# Patient Record
Sex: FEMALE | Race: WHITE | NOT HISPANIC OR LATINO | URBAN - METROPOLITAN AREA
[De-identification: names, ages, dates, MRNs, and addresses within clinical notes are randomized per-mention and may not be internally consistent; named-entity substitution may affect disease eponyms.]

---

## 2024-02-26 ENCOUNTER — INPATIENT (INPATIENT)
Facility: HOSPITAL | Age: 74
LOS: 3 days | Discharge: HOME HEALTH | End: 2024-03-01
Attending: ORTHOPAEDIC SURGERY | Admitting: ORTHOPAEDIC SURGERY
Payer: MEDICARE

## 2024-02-26 ENCOUNTER — RESULT REVIEW (OUTPATIENT)
Age: 74
End: 2024-02-26

## 2024-02-26 VITALS
SYSTOLIC BLOOD PRESSURE: 154 MMHG | DIASTOLIC BLOOD PRESSURE: 76 MMHG | TEMPERATURE: 98 F | RESPIRATION RATE: 17 BRPM | HEART RATE: 99 BPM | OXYGEN SATURATION: 100 %

## 2024-02-26 DIAGNOSIS — S72.009A FRACTURE OF UNSPECIFIED PART OF NECK OF UNSPECIFIED FEMUR, INITIAL ENCOUNTER FOR CLOSED FRACTURE: ICD-10-CM

## 2024-02-26 PROBLEM — Z00.00 ENCOUNTER FOR PREVENTIVE HEALTH EXAMINATION: Status: ACTIVE | Noted: 2024-02-26

## 2024-02-26 LAB
ALBUMIN SERPL ELPH-MCNC: 4.2 G/DL — SIGNIFICANT CHANGE UP (ref 3.3–5)
ALP SERPL-CCNC: 63 U/L — SIGNIFICANT CHANGE UP (ref 40–120)
ALT FLD-CCNC: 13 U/L — SIGNIFICANT CHANGE UP (ref 4–33)
ANION GAP SERPL CALC-SCNC: 18 MMOL/L — HIGH (ref 7–14)
APTT BLD: 28.3 SEC — SIGNIFICANT CHANGE UP (ref 24.5–35.6)
AST SERPL-CCNC: 26 U/L — SIGNIFICANT CHANGE UP (ref 4–32)
BASOPHILS # BLD AUTO: 0.04 K/UL — SIGNIFICANT CHANGE UP (ref 0–0.2)
BASOPHILS NFR BLD AUTO: 0.3 % — SIGNIFICANT CHANGE UP (ref 0–2)
BILIRUB SERPL-MCNC: 0.4 MG/DL — SIGNIFICANT CHANGE UP (ref 0.2–1.2)
BLD GP AB SCN SERPL QL: NEGATIVE — SIGNIFICANT CHANGE UP
BUN SERPL-MCNC: 15 MG/DL — SIGNIFICANT CHANGE UP (ref 7–23)
CALCIUM SERPL-MCNC: 9.7 MG/DL — SIGNIFICANT CHANGE UP (ref 8.4–10.5)
CHLORIDE SERPL-SCNC: 99 MMOL/L — SIGNIFICANT CHANGE UP (ref 98–107)
CO2 SERPL-SCNC: 19 MMOL/L — LOW (ref 22–31)
CREAT SERPL-MCNC: 0.55 MG/DL — SIGNIFICANT CHANGE UP (ref 0.5–1.3)
EGFR: 96 ML/MIN/1.73M2 — SIGNIFICANT CHANGE UP
EOSINOPHIL # BLD AUTO: 0.07 K/UL — SIGNIFICANT CHANGE UP (ref 0–0.5)
EOSINOPHIL NFR BLD AUTO: 0.5 % — SIGNIFICANT CHANGE UP (ref 0–6)
GLUCOSE BLDC GLUCOMTR-MCNC: 134 MG/DL — HIGH (ref 70–99)
GLUCOSE BLDC GLUCOMTR-MCNC: 216 MG/DL — HIGH (ref 70–99)
GLUCOSE SERPL-MCNC: 167 MG/DL — HIGH (ref 70–99)
HCT VFR BLD CALC: 31.3 % — LOW (ref 34.5–45)
HGB BLD-MCNC: 10.8 G/DL — LOW (ref 11.5–15.5)
IANC: 12.84 K/UL — HIGH (ref 1.8–7.4)
IMM GRANULOCYTES NFR BLD AUTO: 0.4 % — SIGNIFICANT CHANGE UP (ref 0–0.9)
INR BLD: 1.04 RATIO — SIGNIFICANT CHANGE UP (ref 0.85–1.18)
LYMPHOCYTES # BLD AUTO: 1.57 K/UL — SIGNIFICANT CHANGE UP (ref 1–3.3)
LYMPHOCYTES # BLD AUTO: 10.3 % — LOW (ref 13–44)
MCHC RBC-ENTMCNC: 30.3 PG — SIGNIFICANT CHANGE UP (ref 27–34)
MCHC RBC-ENTMCNC: 34.5 GM/DL — SIGNIFICANT CHANGE UP (ref 32–36)
MCV RBC AUTO: 87.9 FL — SIGNIFICANT CHANGE UP (ref 80–100)
MONOCYTES # BLD AUTO: 0.73 K/UL — SIGNIFICANT CHANGE UP (ref 0–0.9)
MONOCYTES NFR BLD AUTO: 4.8 % — SIGNIFICANT CHANGE UP (ref 2–14)
NEUTROPHILS # BLD AUTO: 12.84 K/UL — HIGH (ref 1.8–7.4)
NEUTROPHILS NFR BLD AUTO: 83.7 % — HIGH (ref 43–77)
NRBC # BLD: 0 /100 WBCS — SIGNIFICANT CHANGE UP (ref 0–0)
NRBC # FLD: 0 K/UL — SIGNIFICANT CHANGE UP (ref 0–0)
PLATELET # BLD AUTO: 234 K/UL — SIGNIFICANT CHANGE UP (ref 150–400)
POTASSIUM SERPL-MCNC: 4.2 MMOL/L — SIGNIFICANT CHANGE UP (ref 3.5–5.3)
POTASSIUM SERPL-SCNC: 4.2 MMOL/L — SIGNIFICANT CHANGE UP (ref 3.5–5.3)
PROT SERPL-MCNC: 6.9 G/DL — SIGNIFICANT CHANGE UP (ref 6–8.3)
PROTHROM AB SERPL-ACNC: 11.6 SEC — SIGNIFICANT CHANGE UP (ref 9.5–13)
RBC # BLD: 3.56 M/UL — LOW (ref 3.8–5.2)
RBC # FLD: 12 % — SIGNIFICANT CHANGE UP (ref 10.3–14.5)
RH IG SCN BLD-IMP: POSITIVE — SIGNIFICANT CHANGE UP
SODIUM SERPL-SCNC: 136 MMOL/L — SIGNIFICANT CHANGE UP (ref 135–145)
WBC # BLD: 15.31 K/UL — HIGH (ref 3.8–10.5)
WBC # FLD AUTO: 15.31 K/UL — HIGH (ref 3.8–10.5)

## 2024-02-26 PROCEDURE — 73501 X-RAY EXAM HIP UNI 1 VIEW: CPT | Mod: 26,XU

## 2024-02-26 PROCEDURE — 73502 X-RAY EXAM HIP UNI 2-3 VIEWS: CPT | Mod: 26,LT

## 2024-02-26 PROCEDURE — 99232 SBSQ HOSP IP/OBS MODERATE 35: CPT | Mod: 57

## 2024-02-26 PROCEDURE — 72170 X-RAY EXAM OF PELVIS: CPT | Mod: 26,XU

## 2024-02-26 PROCEDURE — 99285 EMERGENCY DEPT VISIT HI MDM: CPT

## 2024-02-26 PROCEDURE — 99223 1ST HOSP IP/OBS HIGH 75: CPT

## 2024-02-26 PROCEDURE — 73552 X-RAY EXAM OF FEMUR 2/>: CPT | Mod: 26,LT

## 2024-02-26 PROCEDURE — 71045 X-RAY EXAM CHEST 1 VIEW: CPT | Mod: 26

## 2024-02-26 RX ORDER — CHLORHEXIDINE GLUCONATE 213 G/1000ML
1 SOLUTION TOPICAL
Refills: 0 | Status: COMPLETED | OUTPATIENT
Start: 2024-02-26 | End: 2024-03-01

## 2024-02-26 RX ORDER — DEXTROSE 50 % IN WATER 50 %
15 SYRINGE (ML) INTRAVENOUS ONCE
Refills: 0 | Status: DISCONTINUED | OUTPATIENT
Start: 2024-02-26 | End: 2024-03-01

## 2024-02-26 RX ORDER — OXYCODONE HYDROCHLORIDE 5 MG/1
5 TABLET ORAL EVERY 4 HOURS
Refills: 0 | Status: DISCONTINUED | OUTPATIENT
Start: 2024-02-26 | End: 2024-03-01

## 2024-02-26 RX ORDER — OXYCODONE HYDROCHLORIDE 5 MG/1
10 TABLET ORAL EVERY 4 HOURS
Refills: 0 | Status: DISCONTINUED | OUTPATIENT
Start: 2024-02-26 | End: 2024-03-01

## 2024-02-26 RX ORDER — INSULIN LISPRO 100/ML
VIAL (ML) SUBCUTANEOUS AT BEDTIME
Refills: 0 | Status: DISCONTINUED | OUTPATIENT
Start: 2024-02-26 | End: 2024-03-01

## 2024-02-26 RX ORDER — POVIDONE-IODINE 5 %
1 AEROSOL (ML) TOPICAL ONCE
Refills: 0 | Status: COMPLETED | OUTPATIENT
Start: 2024-02-26 | End: 2024-02-28

## 2024-02-26 RX ORDER — DEXTROSE 50 % IN WATER 50 %
25 SYRINGE (ML) INTRAVENOUS ONCE
Refills: 0 | Status: DISCONTINUED | OUTPATIENT
Start: 2024-02-26 | End: 2024-03-01

## 2024-02-26 RX ORDER — SENNA PLUS 8.6 MG/1
2 TABLET ORAL AT BEDTIME
Refills: 0 | Status: DISCONTINUED | OUTPATIENT
Start: 2024-02-26 | End: 2024-03-01

## 2024-02-26 RX ORDER — SODIUM CHLORIDE 9 MG/ML
1000 INJECTION, SOLUTION INTRAVENOUS
Refills: 0 | Status: DISCONTINUED | OUTPATIENT
Start: 2024-02-26 | End: 2024-03-01

## 2024-02-26 RX ORDER — GLUCAGON INJECTION, SOLUTION 0.5 MG/.1ML
1 INJECTION, SOLUTION SUBCUTANEOUS ONCE
Refills: 0 | Status: DISCONTINUED | OUTPATIENT
Start: 2024-02-26 | End: 2024-03-01

## 2024-02-26 RX ORDER — ACETAMINOPHEN 500 MG
975 TABLET ORAL ONCE
Refills: 0 | Status: COMPLETED | OUTPATIENT
Start: 2024-02-26 | End: 2024-02-26

## 2024-02-26 RX ORDER — ACETAMINOPHEN 500 MG
975 TABLET ORAL EVERY 8 HOURS
Refills: 0 | Status: DISCONTINUED | OUTPATIENT
Start: 2024-02-26 | End: 2024-03-01

## 2024-02-26 RX ORDER — POLYETHYLENE GLYCOL 3350 17 G/17G
17 POWDER, FOR SOLUTION ORAL DAILY
Refills: 0 | Status: DISCONTINUED | OUTPATIENT
Start: 2024-02-26 | End: 2024-03-01

## 2024-02-26 RX ORDER — SIMVASTATIN 20 MG/1
20 TABLET, FILM COATED ORAL AT BEDTIME
Refills: 0 | Status: DISCONTINUED | OUTPATIENT
Start: 2024-02-26 | End: 2024-03-01

## 2024-02-26 RX ORDER — PANTOPRAZOLE SODIUM 20 MG/1
40 TABLET, DELAYED RELEASE ORAL
Refills: 0 | Status: DISCONTINUED | OUTPATIENT
Start: 2024-02-26 | End: 2024-03-01

## 2024-02-26 RX ORDER — INSULIN LISPRO 100/ML
VIAL (ML) SUBCUTANEOUS
Refills: 0 | Status: DISCONTINUED | OUTPATIENT
Start: 2024-02-26 | End: 2024-03-01

## 2024-02-26 RX ORDER — LISINOPRIL 2.5 MG/1
10 TABLET ORAL DAILY
Refills: 0 | Status: DISCONTINUED | OUTPATIENT
Start: 2024-02-26 | End: 2024-02-26

## 2024-02-26 RX ORDER — DEXTROSE 50 % IN WATER 50 %
12.5 SYRINGE (ML) INTRAVENOUS ONCE
Refills: 0 | Status: DISCONTINUED | OUTPATIENT
Start: 2024-02-26 | End: 2024-03-01

## 2024-02-26 RX ADMIN — Medication 2: at 18:51

## 2024-02-26 RX ADMIN — SIMVASTATIN 20 MILLIGRAM(S): 20 TABLET, FILM COATED ORAL at 22:49

## 2024-02-26 RX ADMIN — OXYCODONE HYDROCHLORIDE 10 MILLIGRAM(S): 5 TABLET ORAL at 16:54

## 2024-02-26 RX ADMIN — Medication 975 MILLIGRAM(S): at 22:48

## 2024-02-26 RX ADMIN — Medication 975 MILLIGRAM(S): at 23:30

## 2024-02-26 RX ADMIN — Medication 975 MILLIGRAM(S): at 11:39

## 2024-02-26 RX ADMIN — OXYCODONE HYDROCHLORIDE 10 MILLIGRAM(S): 5 TABLET ORAL at 16:28

## 2024-02-26 NOTE — ED PROVIDER NOTE - PHYSICAL EXAMINATION
Isabella Lockhart MD  GENERAL: Patient awake alert NAD.  HEENT: NC/AT, Moist mucous membranes, EOMI.  LUNGS: CTAB, no wheezes or crackles.   CARDIAC: RRR, no m/r/g.    ABDOMEN: Soft, NT, ND, No rebound, guarding. No CVA tenderness.   EXT: No edema. No calf tenderness. CV 2+DP/PT bilaterally.   L leg: able to bend knee and full ROM of ankle but unable to straight leg raise 2/2 hip pain. no skin changes or lacs. tender to palpation of medial upper femur. no back/spine tenderness. sensation intact  MSK: No spinal tenderness, no deformities.  NEURO: A&Ox3. Moving all extremities.  SKIN: Warm and dry. No rash.  PSYCH: Normal affect.

## 2024-02-26 NOTE — ED PROVIDER NOTE - CHIEF COMPLAINT
The patient is a 73y Female complaining of groin pain.
individual instruction/verbal instruction/written material

## 2024-02-26 NOTE — H&P ADULT - ASSESSMENT
NPO at midnight  FU TTE  Please document medical clearance  Plan for OR 2/27 for L IMN with Dr. Martínez Mccrary PA-C  Team Pager [phone] A/P 73 year old female with L IT fx    NPO at midnight/IVF  FU TTE  Please document medical clearance  Plan for OR 2/27 for L IMN with Dr. Martínez Mccrary PA-C  Team Pager [phone]

## 2024-02-26 NOTE — CONSULT NOTE ADULT - ASSESSMENT
Patient is a 73-year-old female with a history of DM 2, HTN, HLD who presents to the ED with left hip/groin pain after mechanical fall, found w displaced L IT fracture, plan for OR for ORIF     1 - Ortho  - WB per ortho  - npo  - plan for OR possibly later today, pt repots having some water and a bit of bagel earlier - communicated w ortho  # pre-op - labs and EKG pending - exam w holosystolic R upper sternal 4/6 murmur  no hx of CAD, MI, CHF, arrythmia - prior to fall pt w good functional capacity   currently asymptomatic wo CP, SOB, TAI  no hx of pre-op insulin  EKG pending   RCRI score: 0 points assuming renal fx returns ok   Class I Risk, 3.9 % 30-day risk of death, MI, or cardiac arrest  - exam with 4/6 holosystolic murmur R upper sternal border - pt endorses hx of murmur but cannot elucidate further - would favor having TTE prior to OR to screen for AS - if OR urgent     Patient is a 73-year-old female with a history of DM 2, HTN, HLD who presents to the ED with left hip/groin pain after mechanical fall, found w displaced L IT fracture, plan for OR for ORIF     1 - Ortho - IT fx for ORIF  - WB per ortho  - npo  - plan for OR possibly later today per ortho but pt repots having some water and a bite of bagel earlier - communicated w ortho  # pre-op - labs and EKG pending - exam w holosystolic R upper sternal 4/6 murmur  no hx of CAD, MI, CHF, arrythmia - prior to fall pt w good functional capacity   currently asymptomatic wo CP, SOB, TAI  no hx of pre-op insulin  EKG pending   RCRI score: 0 points assuming renal fx returns ok   Class I Risk, 3.9 % 30-day risk of death, MI, or cardiac arrest  - exam with 4/6 holosystolic murmur R upper sternal border - pt endorses hx of murmur but cannot elucidate further - would favor having TTE prior to OR to screen for AS - if OR urgent however, can proceed pending labs and EKG - dw ortho PA  - x-ray L knee look for effusion, denies hx of gout     2- HTN - cw home Lisinopril pending renal fx and AMY  monitor BP    2- HLD -cw home Zocor    3- DM2 - on metformin at home - hold oral and can start NISS for now - FS in  - will follow trend and check A1C and escalate regimen if needed     3- DVT ppx - defer to ortho pending OR

## 2024-02-26 NOTE — ED ADULT NURSE NOTE - OBJECTIVE STATEMENT
pt received to intake. pt is c/o left hip/groin pain post fall today after she tripped. pt denies headaches, dizziness, n/v/d, abdominal pain, SOB, chest pain, fever like symptoms. pt state she has "bad knees" bilaterally, and is unstable at times. pt denies the use of anticoagulants. Pt denies LOC, or hitting her head.  Pt has limited ROM to the left leg due to pain. Pt endorses taking ibuprofen 2 hrs prior to arrival. pt endorses hx of DM type 2, hypertension, or hyperlipidemia. pt respirations even and unlabored on room air. Pt medicated as prescribed. pt is awaiting x-ray. plan of care ongoing.

## 2024-02-26 NOTE — H&P ADULT - NSHPPHYSICALEXAM_GEN_ALL_CORE
PE  Gen: NAD, alert and oriented  Resp: Unlabored breathing  LLE: Skin intact, no ecchymosis,        SILT DP/SP/ Ayaz/Saph/Post Tib       +EHL/FHL/TA/Gastroc,        Knee/ankle painless ROM,        hip ROM limited 2/2 pain,       DP+,        soft compartments, no calf ttp,        +log roll.

## 2024-02-26 NOTE — H&P ADULT - ATTENDING COMMENTS
Agree with above resident assessment and plan. Patient seen and examined at bedside.     Briefly, 72yo Female with PMH of DM and HTN presents to [facility] c/o L hip pain s/p mechanical fall at home. Patient denies head hit or LOC. Patient denies numbness or tingling in the LLE. Patient denies any other injuries. XR confirm displaced IT hip fx. Admitted to [facility] for surgical care.    PE:  Gen: NAD, alert and oriented  Resp: Unlabored breathing  LLE: Skin intact, no ecchymosis,        SILT DP/SP/ Ayaz/Saph/Post Tib       +EHL/FHL/TA/Gastroc,        Knee/ankle painless ROM,        hip ROM limited 2/2 pain,       DP+,        soft compartments, no calf ttp,        +log roll.    A/P 73 year old female with L IT fx  - Risks, benefits and alternatives to surgical IMN were discussed in great detail. Risks include but not limited to pain, bleeding, infection, implant malfunction, medical complications (DVT, PE, MI) and risks of anesthesia. Electing to proceed, informed consent was obtained.   - NPO / IVF / NWB  - Appreciate medical/cards clearance  - Plan OR 2/27 for IMN L hip

## 2024-02-26 NOTE — CONSULT NOTE ADULT - SUBJECTIVE AND OBJECTIVE BOX
Patient is a 73-year-old female with a history of DM 2, HTN, HLD who presents to the ED with left hip/groin pain after mechanical fall.  Patient states that she was walking around at home and had mechanical trip and fall.  She denies any prodromal symptoms such as chest pain, shortness of breath, palpitations, dizziness, lightheadedness.  She did not hit her head and denies loss of consciousness.  She fell onto her left side and was unable to get up.  Her family put her into a wheelchair and drove her to the hospital.  She has not walked since the incident.  She denies numbness or weakness in her foot or lower leg.  She endorses left groin pain, nonradiating.  Denies nausea, vomiting, abdominal pain, back pain.  on further questioning after noticing murmur on exam, pt states she was told by a cardiologist she has a murmur some 3 years prior, she had TTE done but never followed - she denies CP, TAI, LOC      PAST MEDICAL HISTORY:  DM2 (diabetes mellitus, type 2)     HTN (hypertension).   DM2  HLD    PAST SURGICAL HISTORY:  No significant past surgical history.    SOCIAL HISTORY: NC    Allergies: No Known Allergies      ROS:  No HA/DZ  No Vision changes   No CP, SOB  No N/V/D  No Edema  No Rash  NO weakness, numbness  + L hip pain  + L knee pain     MEDICATIONS  (STANDING):    MEDICATIONS  (PRN):      T(C): 36.6 (02-26-24 @ 10:53)  HR: 99 (02-26-24 @ 10:53)  BP: 154/76 (02-26-24 @ 10:53)  RR: 17 (02-26-24 @ 10:53)  SpO2: 100% (02-26-24 @ 10:53)  CAPILLARY BLOOD GLUCOSE      POCT Blood Glucose.: 233 mg/dL (26 Feb 2024 10:56)    I&O's Summary      PHYSICAL EXAM:  GENERAL: NAD, well-developed, AOx3  HEAD:  Atraumatic, Normocephalic  EYES: EOMI, PERRL, conjunctiva and sclera clear  NECK: Supple, No JVD  CHEST/LUNG: Clear to auscultation bilaterally  HEART: Regular rate and rhythm; No murmurs, rubs, or gallops, No Edema  ABDOMEN: Soft, Nontender, Nondistended; Bowel sounds present  EXTREMITIES:  2+ Peripheral Pulses, No clubbing, cyanosis  PSYCH: No SI/HI  NEUROLOGY: non-focal  SKIN: No rashes or lesions  + R knee swelling, warmth     LABS:                        RADIOLOGY & ADDITIONAL TESTS:    Imaging Personally Reviewed:    Consultant(s) Notes Reviewed:      Care Discussed with Consultants/Other Providers: milagros Vásquez   Patient is a 73-year-old female with a history of DM 2, HTN, HLD who presents to the ED with left hip/groin pain after mechanical fall.  Patient states that she was walking around at home and had mechanical trip and fall.  She denies any prodromal symptoms such as chest pain, shortness of breath, palpitations, dizziness, lightheadedness.  She did not hit her head and denies loss of consciousness.  She fell onto her left side and was unable to get up.  Her family put her into a wheelchair and drove her to the hospital.  She has not walked since the incident.  She denies numbness or weakness in her foot or lower leg.  She endorses left groin pain, nonradiating.  Denies nausea, vomiting, abdominal pain, back pain.  on further questioning after noticing murmur on exam, pt states she was told by a cardiologist she has a murmur some 3 years prior, she had TTE done but never followed - she denies CP, TAI, LOC      PAST MEDICAL HISTORY:  DM2 (diabetes mellitus, type 2)     HTN (hypertension).   DM2  HLD    PAST SURGICAL HISTORY:  No significant past surgical history.    SOCIAL HISTORY: NC    Allergies: No Known Allergies      ROS:  No HA/DZ  No Vision changes   No CP, SOB  No N/V/D  No Edema  No Rash  NO weakness, numbness  + L hip pain  + L knee pain     MEDICATIONS  (STANDING):    MEDICATIONS  (PRN):      T(C): 36.6 (02-26-24 @ 10:53)  HR: 99 (02-26-24 @ 10:53)  BP: 154/76 (02-26-24 @ 10:53)  RR: 17 (02-26-24 @ 10:53)  SpO2: 100% (02-26-24 @ 10:53)  CAPILLARY BLOOD GLUCOSE      POCT Blood Glucose.: 233 mg/dL (26 Feb 2024 10:56)    I&O's Summary      PHYSICAL EXAM:  GENERAL: NAD, well-developed, AOx3  HEAD:  Atraumatic, Normocephalic  EYES: EOMI, PERRL, conjunctiva and sclera clear  NECK: Supple, No JVD  CHEST/LUNG: Clear to auscultation bilaterally  HEART: Regular rate and rhythm; 4/6 r sternal border murmur, no rubs, or gallops, No Edema  ABDOMEN: Soft, Nontender, Nondistended; Bowel sounds present  EXTREMITIES:  2+ Peripheral Pulses, No clubbing, cyanosis  PSYCH: No SI/HI  NEUROLOGY: non-focal  SKIN: No rashes or lesions  + R knee swelling, warmth     LABS:                        RADIOLOGY & ADDITIONAL TESTS:    Imaging Personally Reviewed:    Consultant(s) Notes Reviewed:      Care Discussed with Consultants/Other Providers: milagros Vásquez

## 2024-02-26 NOTE — ED PROVIDER NOTE - CLINICAL SUMMARY MEDICAL DECISION MAKING FREE TEXT BOX
Richy - Patient is a 73-year-old female with a history of DM 2, HTN who presents to the ED with left hip/groin pain after mechanical fall.  ddx includes but is not limited to femur or hip or pelvis fx. will check xrays. give Tylenol for pain

## 2024-02-26 NOTE — ED PROVIDER NOTE - OBJECTIVE STATEMENT
Patient is a 73-year-old female with a history of DM 2, HTN who presents to the ED with left hip/groin pain after mechanical fall.  Patient states that she was walking around at home and had mechanical trip and fall.  She denies any prodromal symptoms such as chest pain, shortness of breath, palpitations, dizziness, lightheadedness.  She did not hit her head and denies loss of consciousness.  She fell onto her left side and was unable to get up.  Her family put her into a wheelchair and drove her to the hospital.  She has not walked since the incident.  She denies numbness or weakness in her foot or lower leg.  She endorses left groin pain, nonradiating.  Denies nausea, vomiting, abdominal pain, back pain.

## 2024-02-26 NOTE — ED PROVIDER NOTE - ATTENDING CONTRIBUTION TO CARE
Agree with fellow note  73-year-old female with history of diabetes and hypertension presents after fall at home.  Patient states clearly mechanical, "clumsy "causing her to trip and fall.  Patient denies any medical complaints prior to fall.  States landed on left side/hip.  Has not been able to get up since incident.  Denies any other injuries.  Denies hitting her head.  Physical exam  Well-appearing female in no respiratory distress  Vital signs stable  Clear to auscultation bilaterally  S1-S2 no murmurs rubs or gallops  Abdomen soft nontender nondistended  Pelvis tenderness to left aspect of pelvis  Hip left leg slightly shorter than right leg, pain to lateral aspect of hip  Impression  X-ray shows left IT fracture  Preop labs sent, Ortho consulted

## 2024-02-26 NOTE — H&P ADULT - HISTORY OF PRESENT ILLNESS
73yFemale with PMH of DM and HTN presents to [facility] c/o L hip pain s/p mechanical fall at home. Patient denies head hit or LOC. Patient denies numbness or tingling in the LLE. Patient denies any other injuries.

## 2024-02-26 NOTE — ED ADULT TRIAGE NOTE - CHIEF COMPLAINT QUOTE
patient c/o L groin pain s/p trip and fall today. denies any head trauma or anticoagulant use. patient having trouble with weight bearing. past medical history of diabetes mellitus type 2, hypertension, hyperlipidemia

## 2024-02-26 NOTE — H&P ADULT - NSHPLABSRESULTS_GEN_ALL_CORE
T(C): 36.6 (02-26-24 @ 10:53)  HR: 99 (02-26-24 @ 10:53)  BP: 154/76 (02-26-24 @ 10:53)  RR: 17 (02-26-24 @ 10:53)  SpO2: 100% (02-26-24 @ 10:53)  Wt(kg): --                        10.8   15.31 )-----------( 234      ( 26 Feb 2024 14:45 )             31.3           PT/INR - ( 26 Feb 2024 14:45 )   PT: 11.6 sec;   INR: 1.04 ratio         PTT - ( 26 Feb 2024 14:45 )  PTT:28.3 sec    Secondary:  No TTP over bony landmarks, SILT BL, ROM intact BL, distal pulses palpable.    Imaging:  XR demonstrating L IT fracture

## 2024-02-27 ENCOUNTER — APPOINTMENT (OUTPATIENT)
Dept: ORTHOPEDIC SURGERY | Facility: HOSPITAL | Age: 74
End: 2024-02-27

## 2024-02-27 ENCOUNTER — TRANSCRIPTION ENCOUNTER (OUTPATIENT)
Age: 74
End: 2024-02-27

## 2024-02-27 DIAGNOSIS — I35.0 NONRHEUMATIC AORTIC (VALVE) STENOSIS: ICD-10-CM

## 2024-02-27 LAB
A1C WITH ESTIMATED AVERAGE GLUCOSE RESULT: 6.2 % — HIGH (ref 4–5.6)
ANION GAP SERPL CALC-SCNC: 13 MMOL/L — SIGNIFICANT CHANGE UP (ref 7–14)
APTT BLD: 27.8 SEC — SIGNIFICANT CHANGE UP (ref 24.5–35.6)
BASOPHILS # BLD AUTO: 0.02 K/UL — SIGNIFICANT CHANGE UP (ref 0–0.2)
BASOPHILS NFR BLD AUTO: 0.2 % — SIGNIFICANT CHANGE UP (ref 0–2)
BLD GP AB SCN SERPL QL: NEGATIVE — SIGNIFICANT CHANGE UP
BUN SERPL-MCNC: 14 MG/DL — SIGNIFICANT CHANGE UP (ref 7–23)
CALCIUM SERPL-MCNC: 9.4 MG/DL — SIGNIFICANT CHANGE UP (ref 8.4–10.5)
CHLORIDE SERPL-SCNC: 96 MMOL/L — LOW (ref 98–107)
CHLORIDE UR-SCNC: 24 MMOL/L — SIGNIFICANT CHANGE UP
CO2 SERPL-SCNC: 22 MMOL/L — SIGNIFICANT CHANGE UP (ref 22–31)
CREAT SERPL-MCNC: 0.61 MG/DL — SIGNIFICANT CHANGE UP (ref 0.5–1.3)
EGFR: 94 ML/MIN/1.73M2 — SIGNIFICANT CHANGE UP
EOSINOPHIL # BLD AUTO: 0 K/UL — SIGNIFICANT CHANGE UP (ref 0–0.5)
EOSINOPHIL NFR BLD AUTO: 0 % — SIGNIFICANT CHANGE UP (ref 0–6)
ESTIMATED AVERAGE GLUCOSE: 131 — SIGNIFICANT CHANGE UP
GLUCOSE BLDC GLUCOMTR-MCNC: 175 MG/DL — HIGH (ref 70–99)
GLUCOSE BLDC GLUCOMTR-MCNC: 179 MG/DL — HIGH (ref 70–99)
GLUCOSE BLDC GLUCOMTR-MCNC: 187 MG/DL — HIGH (ref 70–99)
GLUCOSE BLDC GLUCOMTR-MCNC: 196 MG/DL — HIGH (ref 70–99)
GLUCOSE SERPL-MCNC: 188 MG/DL — HIGH (ref 70–99)
HCT VFR BLD CALC: 27.6 % — LOW (ref 34.5–45)
HGB BLD-MCNC: 9.3 G/DL — LOW (ref 11.5–15.5)
IANC: 6.98 K/UL — SIGNIFICANT CHANGE UP (ref 1.8–7.4)
IMM GRANULOCYTES NFR BLD AUTO: 0.3 % — SIGNIFICANT CHANGE UP (ref 0–0.9)
INR BLD: 1.1 RATIO — SIGNIFICANT CHANGE UP (ref 0.85–1.18)
LYMPHOCYTES # BLD AUTO: 1.7 K/UL — SIGNIFICANT CHANGE UP (ref 1–3.3)
LYMPHOCYTES # BLD AUTO: 18.3 % — SIGNIFICANT CHANGE UP (ref 13–44)
MCHC RBC-ENTMCNC: 30.9 PG — SIGNIFICANT CHANGE UP (ref 27–34)
MCHC RBC-ENTMCNC: 33.7 GM/DL — SIGNIFICANT CHANGE UP (ref 32–36)
MCV RBC AUTO: 91.7 FL — SIGNIFICANT CHANGE UP (ref 80–100)
MONOCYTES # BLD AUTO: 0.57 K/UL — SIGNIFICANT CHANGE UP (ref 0–0.9)
MONOCYTES NFR BLD AUTO: 6.1 % — SIGNIFICANT CHANGE UP (ref 2–14)
NEUTROPHILS # BLD AUTO: 6.98 K/UL — SIGNIFICANT CHANGE UP (ref 1.8–7.4)
NEUTROPHILS NFR BLD AUTO: 75.1 % — SIGNIFICANT CHANGE UP (ref 43–77)
NRBC # BLD: 0 /100 WBCS — SIGNIFICANT CHANGE UP (ref 0–0)
NRBC # FLD: 0 K/UL — SIGNIFICANT CHANGE UP (ref 0–0)
OSMOLALITY SERPL: 287 MOSM/KG — SIGNIFICANT CHANGE UP (ref 275–295)
OSMOLALITY UR: 242 MOSM/KG — SIGNIFICANT CHANGE UP (ref 50–1200)
PLATELET # BLD AUTO: 214 K/UL — SIGNIFICANT CHANGE UP (ref 150–400)
POTASSIUM SERPL-MCNC: 4 MMOL/L — SIGNIFICANT CHANGE UP (ref 3.5–5.3)
POTASSIUM SERPL-SCNC: 4 MMOL/L — SIGNIFICANT CHANGE UP (ref 3.5–5.3)
POTASSIUM UR-SCNC: 29.3 MMOL/L — SIGNIFICANT CHANGE UP
PROTHROM AB SERPL-ACNC: 12.4 SEC — SIGNIFICANT CHANGE UP (ref 9.5–13)
RBC # BLD: 3.01 M/UL — LOW (ref 3.8–5.2)
RBC # FLD: 12.1 % — SIGNIFICANT CHANGE UP (ref 10.3–14.5)
RH IG SCN BLD-IMP: POSITIVE — SIGNIFICANT CHANGE UP
SODIUM SERPL-SCNC: 131 MMOL/L — LOW (ref 135–145)
SODIUM UR-SCNC: 24 MMOL/L — SIGNIFICANT CHANGE UP
WBC # BLD: 9.3 K/UL — SIGNIFICANT CHANGE UP (ref 3.8–10.5)
WBC # FLD AUTO: 9.3 K/UL — SIGNIFICANT CHANGE UP (ref 3.8–10.5)

## 2024-02-27 PROCEDURE — 99233 SBSQ HOSP IP/OBS HIGH 50: CPT

## 2024-02-27 PROCEDURE — 99223 1ST HOSP IP/OBS HIGH 75: CPT | Mod: FS

## 2024-02-27 RX ORDER — INFLUENZA VIRUS VACCINE 15; 15; 15; 15 UG/.5ML; UG/.5ML; UG/.5ML; UG/.5ML
0.7 SUSPENSION INTRAMUSCULAR ONCE
Refills: 0 | Status: DISCONTINUED | OUTPATIENT
Start: 2024-02-27 | End: 2024-03-01

## 2024-02-27 RX ORDER — POVIDONE-IODINE 5 %
1 AEROSOL (ML) TOPICAL ONCE
Refills: 0 | Status: DISCONTINUED | OUTPATIENT
Start: 2024-02-27 | End: 2024-02-27

## 2024-02-27 RX ORDER — ENOXAPARIN SODIUM 100 MG/ML
40 INJECTION SUBCUTANEOUS ONCE
Refills: 0 | Status: COMPLETED | OUTPATIENT
Start: 2024-02-27 | End: 2024-02-27

## 2024-02-27 RX ORDER — CHLORHEXIDINE GLUCONATE 213 G/1000ML
1 SOLUTION TOPICAL ONCE
Refills: 0 | Status: COMPLETED | OUTPATIENT
Start: 2024-02-27 | End: 2024-02-28

## 2024-02-27 RX ADMIN — CHLORHEXIDINE GLUCONATE 1 APPLICATION(S): 213 SOLUTION TOPICAL at 08:00

## 2024-02-27 RX ADMIN — OXYCODONE HYDROCHLORIDE 5 MILLIGRAM(S): 5 TABLET ORAL at 12:13

## 2024-02-27 RX ADMIN — Medication 975 MILLIGRAM(S): at 23:15

## 2024-02-27 RX ADMIN — Medication 975 MILLIGRAM(S): at 07:28

## 2024-02-27 RX ADMIN — OXYCODONE HYDROCHLORIDE 10 MILLIGRAM(S): 5 TABLET ORAL at 20:00

## 2024-02-27 RX ADMIN — Medication 975 MILLIGRAM(S): at 22:10

## 2024-02-27 RX ADMIN — Medication 1: at 17:23

## 2024-02-27 RX ADMIN — SODIUM CHLORIDE 100 MILLILITER(S): 9 INJECTION, SOLUTION INTRAVENOUS at 06:38

## 2024-02-27 RX ADMIN — SODIUM CHLORIDE 100 MILLILITER(S): 9 INJECTION, SOLUTION INTRAVENOUS at 03:47

## 2024-02-27 RX ADMIN — SIMVASTATIN 20 MILLIGRAM(S): 20 TABLET, FILM COATED ORAL at 21:11

## 2024-02-27 RX ADMIN — Medication 975 MILLIGRAM(S): at 13:58

## 2024-02-27 RX ADMIN — PANTOPRAZOLE SODIUM 40 MILLIGRAM(S): 20 TABLET, DELAYED RELEASE ORAL at 06:40

## 2024-02-27 RX ADMIN — Medication 975 MILLIGRAM(S): at 06:39

## 2024-02-27 RX ADMIN — OXYCODONE HYDROCHLORIDE 5 MILLIGRAM(S): 5 TABLET ORAL at 11:43

## 2024-02-27 RX ADMIN — SODIUM CHLORIDE 100 MILLILITER(S): 9 INJECTION, SOLUTION INTRAVENOUS at 17:26

## 2024-02-27 RX ADMIN — OXYCODONE HYDROCHLORIDE 10 MILLIGRAM(S): 5 TABLET ORAL at 19:36

## 2024-02-27 RX ADMIN — OXYCODONE HYDROCHLORIDE 5 MILLIGRAM(S): 5 TABLET ORAL at 04:17

## 2024-02-27 RX ADMIN — OXYCODONE HYDROCHLORIDE 5 MILLIGRAM(S): 5 TABLET ORAL at 03:47

## 2024-02-27 RX ADMIN — Medication 1: at 07:57

## 2024-02-27 RX ADMIN — Medication 1: at 11:44

## 2024-02-27 RX ADMIN — ENOXAPARIN SODIUM 40 MILLIGRAM(S): 100 INJECTION SUBCUTANEOUS at 16:07

## 2024-02-27 NOTE — PATIENT PROFILE ADULT - FALL HARM RISK - HARM RISK INTERVENTIONS
Assistance with ambulation/Assistance OOB with selected safe patient handling equipment/Communicate Risk of Fall with Harm to all staff/Discuss with provider need for PT consult/Monitor gait and stability/Reinforce activity limits and safety measures with patient and family/Tailored Fall Risk Interventions/Visual Cue: Yellow wristband and red socks/Bed in lowest position, wheels locked, appropriate side rails in place/Call bell, personal items and telephone in reach/Instruct patient to call for assistance before getting out of bed or chair/Non-slip footwear when patient is out of bed/Dale to call system/Physically safe environment - no spills, clutter or unnecessary equipment/Purposeful Proactive Rounding/Room/bathroom lighting operational, light cord in reach

## 2024-02-27 NOTE — PROGRESS NOTE ADULT - SUBJECTIVE AND OBJECTIVE BOX
Patient is a 73y old  Female who presents with a chief complaint of L hip fx (27 Feb 2024 10:35)      SUBJECTIVE / OVERNIGHT EVENTS: OR cancelled - TTE noted, DW ortho - dw pt and daughter at bedside - pt asymptomatic     ROS:  No HA/DZ  No Vision changes   No CP, SOB  No N/V/D  No Edema  No Rash  NO weakness, numbness    MEDICATIONS  (STANDING):  acetaminophen     Tablet .. 975 milliGRAM(s) Oral every 8 hours  chlorhexidine 2% Cloths 1 Application(s) Topical <User Schedule>  dextrose 5%. 1000 milliLiter(s) (50 mL/Hr) IV Continuous <Continuous>  dextrose 5%. 1000 milliLiter(s) (100 mL/Hr) IV Continuous <Continuous>  dextrose 50% Injectable 12.5 Gram(s) IV Push once  dextrose 50% Injectable 25 Gram(s) IV Push once  dextrose 50% Injectable 25 Gram(s) IV Push once  glucagon  Injectable 1 milliGRAM(s) IntraMuscular once  insulin lispro (ADMELOG) corrective regimen sliding scale   SubCutaneous three times a day before meals  insulin lispro (ADMELOG) corrective regimen sliding scale   SubCutaneous at bedtime  lactated ringers. 1000 milliLiter(s) (100 mL/Hr) IV Continuous <Continuous>  pantoprazole    Tablet 40 milliGRAM(s) Oral before breakfast  polyethylene glycol 3350 17 Gram(s) Oral daily  povidone iodine 5% Nasal Swab 1 Application(s) Both Nostrils once  senna 2 Tablet(s) Oral at bedtime  simvastatin 20 milliGRAM(s) Oral at bedtime    MEDICATIONS  (PRN):  dextrose Oral Gel 15 Gram(s) Oral once PRN Blood Glucose LESS THAN 70 milliGRAM(s)/deciliter  oxyCODONE    IR 10 milliGRAM(s) Oral every 4 hours PRN Severe Pain (7 - 10)  oxyCODONE    IR 5 milliGRAM(s) Oral every 4 hours PRN Moderate Pain (4 - 6)      T(C): 36.6 (02-27-24 @ 10:57)  HR: 92 (02-27-24 @ 10:57)  BP: 120/64 (02-27-24 @ 10:57)  RR: 16 (02-27-24 @ 10:57)  SpO2: 96% (02-27-24 @ 10:57)  CAPILLARY BLOOD GLUCOSE      POCT Blood Glucose.: 179 mg/dL (27 Feb 2024 07:30)  POCT Blood Glucose.: 134 mg/dL (26 Feb 2024 22:40)  POCT Blood Glucose.: 216 mg/dL (26 Feb 2024 18:09)    I&O's Summary      PHYSICAL EXAM:  GENERAL: NAD, well-developed, AOx3  HEAD:  Atraumatic, Normocephalic  EYES: EOMI, PERRL, conjunctiva and sclera clear  NECK: Supple, No JVD  CHEST/LUNG: Clear to auscultation bilaterally  HEART: Regular rate and rhythm; + murmurs, no rubs, or gallops, No Edema  ABDOMEN: Soft, Nontender, Nondistended; Bowel sounds present  EXTREMITIES:  2+ Peripheral Pulses, No clubbing, cyanosis  PSYCH: No SI/HI  NEUROLOGY: non-focal  SKIN: No rashes or lesions  L knee swelling     LABS:                        9.3    9.30  )-----------( 214      ( 27 Feb 2024 01:09 )             27.6     02-27    131<L>  |  96<L>  |  14  ----------------------------<  188<H>  4.0   |  22  |  0.61    Ca    9.4      27 Feb 2024 01:09    TPro  6.9  /  Alb  4.2  /  TBili  0.4  /  DBili  x   /  AST  26  /  ALT  13  /  AlkPhos  63  02-26    PT/INR - ( 27 Feb 2024 01:09 )   PT: 12.4 sec;   INR: 1.10 ratio         PTT - ( 27 Feb 2024 01:09 )  PTT:27.8 sec      Urinalysis Basic - ( 27 Feb 2024 01:09 )    Color: x / Appearance: x / SG: x / pH: x  Gluc: 188 mg/dL / Ketone: x  / Bili: x / Urobili: x   Blood: x / Protein: x / Nitrite: x   Leuk Esterase: x / RBC: x / WBC x   Sq Epi: x / Non Sq Epi: x / Bacteria: x            RADIOLOGY & ADDITIONAL TESTS:    Imaging Personally Reviewed:    Consultant(s) Notes Reviewed:      Care Discussed with Consultants/Other Providers:

## 2024-02-27 NOTE — PROGRESS NOTE ADULT - SUBJECTIVE AND OBJECTIVE BOX
Patient history reviewed. Preop evaluation shows critical aortic stenosis, discussed risk/benefit of urgent hip IMN with Dr. Hines. Recommendation from Cardiology is for preoperative mechanical cardiology consultation to determine risk of Ortho surgery and consideration of preoperative TAVR given findings on echo.    OR currently on hold for additional cardiac workup and possible transfer to [facility] for urgent critical cardiac care.     Will follow

## 2024-02-27 NOTE — CONSULT NOTE ADULT - NS ATTEND AMEND GEN_ALL_CORE FT
Patient seen and examined with the physician assistant, Currently patient is in bedComplaining of pain on the right hip but otherwise no shortness of breath or chest pain symptoms.  I spoke to the patient and her daughter her fall is mechanical in nature because of her inability to maintain her balance because of her knee pain.  Patient reports that she did not lose any consciousness or any dizziness prior to the event.  In addition patient states that prior to this event over the last month she was able to walk a flight of stairs without any shortness of breath but her activity is mostly limited because of her knee pains.  Patient denies any orthopnea PND or edema in the preceding months.  I reviewed the echocardiogram, Patient does have severe aortic stenosis with normal ejection fraction.  Recommendations from cardiologist vascular standpoint are as follows.    - it is imperative that her perioperative management around the time getting the hip surgery should be managed by Cardiac anesthesiologist with special focus on minimizing BP and volume shifts. She is higher than average patient for having adverse cardiovascular events due to fixed obstructive valve condition,   -Post operative she should be monitored on telemetry if she is stable  - she will need further workup and TAVR/SAVR for aortic stenosis depending on her clinical course .  But this will likeley be done electively once she has recovered from her hip surgery.  Going by patient's history she did not have any symptoms to report related to her underlying aortic valve disease .  But will reassess her symptoms and due  courseOn subsequent follow-ups.  - currently patient has main issues related to her right hip pain from the hip fracture, that will have significant negative bearing if not treated appropriately. I recommended her hip surgery should be performed By orthopedics In conjunction with cardiac anesthesia Managing her perioperative care. There is no indication for BAV/TAVR to be be urgently preop knowing that she is othetwise stable,   -Please call us with any perioperatove issues. We will follow the patient in the postop period.

## 2024-02-27 NOTE — CONSULT NOTE ADULT - ASSESSMENT
Pre-Operative Cardiac Risk Stratification and Optimization    Based on patient history and physical exam, the patient is considered to have high risk for major CV events   Echo shows critical aortic stenosis  although she is asymptomatic , her ET is limited due to knee OA   EKG shows diffuse ischemia which can be due to her critical AS  given critical range aortic valve stenosis , recommend structural heart consult to evaluate for BAV vs TAVR if feasible prior to surgery ( Dr Aries Crockett contacted )     Aortic stenosis  critical range   BP labile   would hold off to BB for now     HLD   on statin      Advanced care planning was discussed with patient and family.  Risks, benefits and alternatives of the cardiac treatments and medical therapy including procedures were discussed in detail and all questions were answered. Importance of compliance with medical therapy and lifestyle modification to improve cardiovascular health were addressed. Appropriate forms and patient educational materials were reviewed. 30 minutes face to face spent.

## 2024-02-27 NOTE — CONSULT NOTE ADULT - SUBJECTIVE AND OBJECTIVE BOX
CHIEF COMPLAINT:    HISTORY OF PRESENT ILLNESS:      Allergies    No Known Allergies    Intolerances    	    MEDICATIONS:        acetaminophen     Tablet .. 975 milliGRAM(s) Oral every 8 hours  oxyCODONE    IR 5 milliGRAM(s) Oral every 4 hours PRN  oxyCODONE    IR 10 milliGRAM(s) Oral every 4 hours PRN    pantoprazole    Tablet 40 milliGRAM(s) Oral before breakfast  polyethylene glycol 3350 17 Gram(s) Oral daily  senna 2 Tablet(s) Oral at bedtime    dextrose 50% Injectable 25 Gram(s) IV Push once  dextrose 50% Injectable 12.5 Gram(s) IV Push once  dextrose 50% Injectable 25 Gram(s) IV Push once  dextrose Oral Gel 15 Gram(s) Oral once PRN  glucagon  Injectable 1 milliGRAM(s) IntraMuscular once  insulin lispro (ADMELOG) corrective regimen sliding scale   SubCutaneous at bedtime  insulin lispro (ADMELOG) corrective regimen sliding scale   SubCutaneous three times a day before meals  simvastatin 20 milliGRAM(s) Oral at bedtime    chlorhexidine 2% Cloths 1 Application(s) Topical <User Schedule>  dextrose 5%. 1000 milliLiter(s) IV Continuous <Continuous>  dextrose 5%. 1000 milliLiter(s) IV Continuous <Continuous>  lactated ringers. 1000 milliLiter(s) IV Continuous <Continuous>  povidone iodine 5% Nasal Swab 1 Application(s) Both Nostrils once      PAST MEDICAL & SURGICAL HISTORY:  DM2 (diabetes mellitus, type 2)      HTN (hypertension)      No significant past surgical history          FAMILY HISTORY:      SOCIAL HISTORY:    [ ] Non-smoker  [ ] Smoker  [ ] Alcohol  [ ] Denies Alcohol      REVIEW OF SYSTEMS:  CONSTITUTIONAL: no fevers or chills  EYES/ENT: No visual changes; No vertigo or throat pain  NECK: No JVD  RESPIRATORY:  No cough, wheezing, chills or hemoptysis, SOB  CARDIOVASCULAR: No chest pain, palpitations, passing out, dizziness, or leg swelling  GASTROINTESTINAL: No abdominal or epigastric pain. No nausea/vomiting/melena  Genitourinary: No dysuria, frequency or hematuria  NEUROLOGICAL: No numbness or weakness  SKIN: No itching, burning, rashes or lesions      PHYSICAL EXAM:  T(C): 36.6 (02-27-24 @ 10:57), Max: 37.1 (02-26-24 @ 17:19)  HR: 92 (02-27-24 @ 10:57) (72 - 92)  BP: 120/64 (02-27-24 @ 10:57) (91/44 - 129/60)  RR: 16 (02-27-24 @ 10:57) (15 - 18)  SpO2: 96% (02-27-24 @ 10:57) (96% - 99%)  Wt(kg): --  ICU Vital Signs Last 24 Hrs  T(C): 36.6 (27 Feb 2024 10:57), Max: 37.1 (26 Feb 2024 17:19)  T(F): 97.8 (27 Feb 2024 10:57), Max: 98.7 (26 Feb 2024 17:19)  HR: 92 (27 Feb 2024 10:57) (72 - 92)  BP: 120/64 (27 Feb 2024 10:57) (91/44 - 129/60)  BP(mean): --  ABP: --  ABP(mean): --  RR: 16 (27 Feb 2024 10:57) (15 - 18)  SpO2: 96% (27 Feb 2024 10:57) (96% - 99%)    O2 Parameters below as of 27 Feb 2024 10:57  Patient On (Oxygen Delivery Method): room air          I&O's Summary        Appearance: Normal	  HEENT:   Normal oral mucosa	  Cardiovascular: Normal S1 S2, No JVD, No murmurs, No edema  Respiratory: Lungs clear to auscultation	  Psychiatry: A & O x 3, Mood & affect appropriate  Gastrointestinal:  Soft, Non-tender, + BS	  Skin: No rashes, No ecchymoses, No cyanosis	  Neurologic: Non-focal  Extremities: Warm and well perfused. 2+ pulses bilaterally        LABS:	 	    CBC Full  -  ( 27 Feb 2024 01:09 )  WBC Count : 9.30 K/uL  Hemoglobin : 9.3 g/dL  Hematocrit : 27.6 %  Platelet Count - Automated : 214 K/uL  Mean Cell Volume : 91.7 fL  Mean Cell Hemoglobin : 30.9 pg  Mean Cell Hemoglobin Concentration : 33.7 gm/dL  Auto Neutrophil # : 6.98 K/uL  Auto Lymphocyte # : 1.70 K/uL  Auto Monocyte # : 0.57 K/uL  Auto Eosinophil # : 0.00 K/uL  Auto Basophil # : 0.02 K/uL  Auto Neutrophil % : 75.1 %  Auto Lymphocyte % : 18.3 %  Auto Monocyte % : 6.1 %  Auto Eosinophil % : 0.0 %  Auto Basophil % : 0.2 %    02-27    131<L>  |  96<L>  |  14  ----------------------------<  188<H>  4.0   |  22  |  0.61  02-26    136  |  99  |  15  ----------------------------<  167<H>  4.2   |  19<L>  |  0.55    Ca    9.4      27 Feb 2024 01:09  Ca    9.7      26 Feb 2024 14:45    TPro  6.9  /  Alb  4.2  /  TBili  0.4  /  DBili  x   /  AST  26  /  ALT  13  /  AlkPhos  63  02-26      proBNP:   Lipid Profile:   HgA1c:   TSH:     CARDIAC MARKERS:                TELEMETRY: 	    ECG:  	    PREVIOUS DIAGNOSTIC TESTING:    [ ] Echocardiogram:  [ ]  Catheterization:  [ ] Stress Test:  	   CHIEF COMPLAINT: Left groin pain    HISTORY OF PRESENT ILLNESS: 72 y/o female with a PMHx of HTN, HLD, DM and OA presented to ED s/p mechanical slip and fall. Pt reports that she tripped on a rug at home and landed on her left hip. Pt does not admit to any symptoms prior to the fall and denies dizziness, syncope, loss of consciousness, chest pain or dyspnea. Pt did not have any other injuries or head trauma. Pt started to experience pain in her left groin after the fall and continues to experience this pain, which is moderate to severe. Pt was unable to bear weight on the hip so was subsequently brought in by her family. X-ray of the left hip reveals an acute displaced and impacted left proximal intertrochanteric femoral fracture. Pt was admitted to the orthopedics service. An echocardiogram is concerning for severe aortic stenosis. Interventional cardiology was consulted for consideration of possible TAVR.    Of note, pt does think she was told she had a "stenotic valve" by her cardiologist in [place]. Her last visit was over one year ago and she was never offered surgery. She was told this was an "age-related" condition.      Allergies: No Known Allergies    Intolerances: No Known Intolerances      MEDICATIONS:    acetaminophen Tablet 975 milliGRAM(s) Oral every 8 hours  oxyCODONE IR 5 milliGRAM(s) Oral every 4 hours PRN  oxyCODONE IR 10 milliGRAM(s) Oral every 4 hours PRN  pantoprazole Tablet 40 milliGRAM(s) Oral before breakfast  polyethylene glycol 3350 17 Gram(s) Oral daily  senna 2 Tablet(s) Oral at bedtime  dextrose 50% Injectable 25 Gram(s) IV Push once  dextrose 50% Injectable 12.5 Gram(s) IV Push once  dextrose 50% Injectable 25 Gram(s) IV Push once  dextrose Oral Gel 15 Gram(s) Oral once PRN  glucagon Injectable 1 milliGRAM(s) IntraMuscular once  insulin lispro (ADMELOG) corrective regimen sliding scale   SubCutaneous at bedtime  insulin lispro (ADMELOG) corrective regimen sliding scale   SubCutaneous three times a day before meals  simvastatin 20 milliGRAM(s) Oral at bedtime  chlorhexidine 2% Cloths 1 Application(s) Topical <User Schedule>  dextrose 5% 1000 milliLiter(s) IV Continuous <Continuous>  dextrose 5% 1000 milliLiter(s) IV Continuous <Continuous>  lactated ringers 1000 milliLiter(s) IV Continuous <Continuous>  povidone iodine 5% Nasal Swab 1 Application(s) Both Nostrils once      PAST MEDICAL & SURGICAL HISTORY:  DM2 (diabetes mellitus, type 2)  HTN (hypertension)  HLD (hyperlipidemia)  OA (osteoarthritis)    No significant past surgical history    FAMILY HISTORY:  Mother - smoker who  of congestive heart failure      SOCIAL HISTORY:    [x] Non-smoker  [ ] Smoker  [ ] Alcohol  [x] Denies Alcohol        T(C): 36.6 (24 @ 10:57), Max: 37.1 (24 @ 17:19)  HR: 92 (24 @ 10:57) (72 - 92)  BP: 120/64 (24 @ 10:57) (91/44 - 129/60)  RR: 16 (24 @ 10:57) (15 - 18)  SpO2: 96% (24 @ 10:57) (96% - 99%)  Wt(kg): --  ICU Vital Signs Last 24 Hrs  T(C): 36.6 (2024 10:57), Max: 37.1 (2024 17:19)  T(F): 97.8 (2024 10:57), Max: 98.7 (2024 17:19)  HR: 92 (2024 10:57) (72 - 92)  BP: 120/64 (2024 10:57) (91/44 - 129/60)  BP(mean): --  ABP: --  ABP(mean): --  RR: 16 (2024 10:57) (15 - 18)  SpO2: 96% (2024 10:57) (96% - 99%)    O2 Parameters below as of 2024 10:57  Patient On (Oxygen Delivery Method): room air    PHYSICAL EXAM:    Appearance: Normal. No distress.  HEENT: Normal oral mucosa.  Cardiovascular: Normal S1 and S2. No JVD. III/VI blowing systolic murmur.  Respiratory: Lungs clear to auscultation. No rhonchi, rales, wheezing.  Gastrointestinal: Soft. Non-tender. Non-distended. Normal bowel sounds.  Skin: No rashes. No ecchymoses. No cyanosis.	  Neurologic: A&Ox3. Non-focal.  Musculoskeletal: Unable to assess LLE. Other extremities with good strength/ROM and sensation.  Extremities: Warm and well perfused. 2+ pulses bilaterally. No edema.   Psychiatry: Mood and affect appropriate.      LABS:	 	    CBC Full  -  ( 2024 01:09 )  WBC Count : 9.30 K/uL  Hemoglobin : 9.3 g/dL  Hematocrit : 27.6 %  Platelet Count - Automated : 214 K/uL  Mean Cell Volume : 91.7 fL  Mean Cell Hemoglobin : 30.9 pg  Mean Cell Hemoglobin Concentration : 33.7 gm/dL  Auto Neutrophil # : 6.98 K/uL  Auto Lymphocyte # : 1.70 K/uL  Auto Monocyte # : 0.57 K/uL  Auto Eosinophil # : 0.00 K/uL  Auto Basophil # : 0.02 K/uL  Auto Neutrophil % : 75.1 %  Auto Lymphocyte % : 18.3 %  Auto Monocyte % : 6.1 %  Auto Eosinophil % : 0.0 %  Auto Basophil % : 0.2 %        131<L>  |  96<L>  |  14  ----------------------------<  188<H>  4.0   |  22  |  0.61      136  |  99  |  15  ----------------------------<  167<H>  4.2   |  19<L>  |  0.55    Ca    9.4      2024 01:09  Ca    9.7      2024 14:45    TPro  6.9  /  Alb  4.2  /  TBili  0.4  /  DBili  x   /  AST  26  /  ALT  13  /  AlkPhos  63          ECG: Sinus tachycardia at 103 bpm with mild ST depressions V5-V6    TELEMETRY: NSR, no events    RADIOLOGIC TESTING:        DIAGNOSTIC TESTIN/26/24 Echocardiogram (24): Left ventricular cavity is small. Left ventricular wall thickness is mildly increased. Left ventricular systolic function is normal with an ejection fraction of 65% by Madrigal's method of disks. There are no regional wall motion abnormalities seen. Normal right ventricular cavity size, with wall thickness, and normal systolic function. There is severe aortic stenosis. The peak transaortic velocity is 5.37 m/s, peak transaortic gradient is 115.3 mmHg and mean transaortic gradient is 69.0 mmHg with an LVOT/aortic valve VTI ratio of 0.18. The aortic valve area is estimated at 0.68 cm² by the continuity equation. There is mild to moderate aortic regurgitation. No pericardial effusion seen. No prior echocardiogram is available for comparison.  [ ] Catheterization: N/A  [ ] Stress Test: N/A CHIEF COMPLAINT: Left groin pain    HISTORY OF PRESENT ILLNESS: 74 y/o female with a PMHx of HTN, HLD, DM and OA presented to ED s/p mechanical slip and fall. Pt reports that she tripped on a rug at home and landed on her left hip. Pt does not admit to any symptoms prior to the fall and denies dizziness, syncope, loss of consciousness, chest pain or dyspnea. Pt did not have any other injuries or head trauma. Pt started to experience pain in her left groin after the fall and continues to experience this pain, which is moderate to severe. Pt was unable to bear weight on the hip so was subsequently brought in by her family. X-ray of the left hip reveals an acute displaced and impacted left proximal intertrochanteric femoral fracture. Pt was admitted to the orthopedics service. An echocardiogram is concerning for severe aortic stenosis. Interventional cardiology was consulted for consideration of possible TAVR.    Of note, pt does think she was told she had a "stenotic valve" by her cardiologist in [place]. Her last visit was over one year ago and she was never offered surgery. She was told this was an "age-related" condition. Pt does not admit to exertional dyspnea or chest pain, but is unable to elicit if she is ever symptomatic because her activity is limited by severe arthritis of her knees.      Allergies: No Known Allergies    Intolerances: No Known Intolerances      MEDICATIONS:    acetaminophen Tablet 975 milliGRAM(s) Oral every 8 hours  oxyCODONE IR 5 milliGRAM(s) Oral every 4 hours PRN  oxyCODONE IR 10 milliGRAM(s) Oral every 4 hours PRN  pantoprazole Tablet 40 milliGRAM(s) Oral before breakfast  polyethylene glycol 3350 17 Gram(s) Oral daily  senna 2 Tablet(s) Oral at bedtime  dextrose 50% Injectable 25 Gram(s) IV Push once  dextrose 50% Injectable 12.5 Gram(s) IV Push once  dextrose 50% Injectable 25 Gram(s) IV Push once  dextrose Oral Gel 15 Gram(s) Oral once PRN  glucagon Injectable 1 milliGRAM(s) IntraMuscular once  insulin lispro (ADMELOG) corrective regimen sliding scale   SubCutaneous at bedtime  insulin lispro (ADMELOG) corrective regimen sliding scale   SubCutaneous three times a day before meals  simvastatin 20 milliGRAM(s) Oral at bedtime  chlorhexidine 2% Cloths 1 Application(s) Topical <User Schedule>  dextrose 5% 1000 milliLiter(s) IV Continuous <Continuous>  dextrose 5% 1000 milliLiter(s) IV Continuous <Continuous>  lactated ringers 1000 milliLiter(s) IV Continuous <Continuous>  povidone iodine 5% Nasal Swab 1 Application(s) Both Nostrils once      PAST MEDICAL & SURGICAL HISTORY:  DM2 (diabetes mellitus, type 2)  HTN (hypertension)  HLD (hyperlipidemia)  OA (osteoarthritis)    No significant past surgical history    FAMILY HISTORY:  Mother - smoker who  of congestive heart failure      SOCIAL HISTORY:    [x] Non-smoker  [ ] Smoker  [ ] Alcohol  [x] Denies Alcohol        T(C): 36.6 (24 @ 10:57), Max: 37.1 (24 @ 17:19)  HR: 92 (24 @ 10:57) (72 - 92)  BP: 120/64 (24 @ 10:57) (91/44 - 129/60)  RR: 16 (24 @ 10:57) (15 - 18)  SpO2: 96% (24 @ 10:57) (96% - 99%)  Wt(kg): --  ICU Vital Signs Last 24 Hrs  T(C): 36.6 (2024 10:57), Max: 37.1 (2024 17:19)  T(F): 97.8 (2024 10:57), Max: 98.7 (2024 17:19)  HR: 92 (2024 10:57) (72 - 92)  BP: 120/64 (2024 10:57) (91/44 - 129/60)  BP(mean): --  ABP: --  ABP(mean): --  RR: 16 (2024 10:57) (15 - 18)  SpO2: 96% (2024 10:57) (96% - 99%)    O2 Parameters below as of 2024 10:57  Patient On (Oxygen Delivery Method): room air    PHYSICAL EXAM:    Appearance: Normal. No distress.  HEENT: Normal oral mucosa.  Cardiovascular: Normal S1 and S2. No JVD. III/VI blowing systolic murmur.  Respiratory: Lungs clear to auscultation. No rhonchi, rales, wheezing.  Gastrointestinal: Soft. Non-tender. Non-distended. Normal bowel sounds.  Skin: No rashes. No ecchymoses. No cyanosis.	  Neurologic: A&Ox3. Non-focal.  Musculoskeletal: Unable to assess LLE. Other extremities with good strength/ROM and sensation.  Extremities: Warm and well perfused. 2+ pulses bilaterally. No edema.   Psychiatry: Mood and affect appropriate.      LABS:	 	    CBC Full  -  ( 2024 01:09 )  WBC Count : 9.30 K/uL  Hemoglobin : 9.3 g/dL  Hematocrit : 27.6 %  Platelet Count - Automated : 214 K/uL  Mean Cell Volume : 91.7 fL  Mean Cell Hemoglobin : 30.9 pg  Mean Cell Hemoglobin Concentration : 33.7 gm/dL  Auto Neutrophil # : 6.98 K/uL  Auto Lymphocyte # : 1.70 K/uL  Auto Monocyte # : 0.57 K/uL  Auto Eosinophil # : 0.00 K/uL  Auto Basophil # : 0.02 K/uL  Auto Neutrophil % : 75.1 %  Auto Lymphocyte % : 18.3 %  Auto Monocyte % : 6.1 %  Auto Eosinophil % : 0.0 %  Auto Basophil % : 0.2 %        131<L>  |  96<L>  |  14  ----------------------------<  188<H>  4.0   |  22  |  0.61      136  |  99  |  15  ----------------------------<  167<H>  4.2   |  19<L>  |  0.55    Ca    9.4      2024 01:09  Ca    9.7      2024 14:45    TPro  6.9  /  Alb  4.2  /  TBili  0.4  /  DBili  x   /  AST  26  /  ALT  13  /  AlkPhos  63          ECG: Sinus tachycardia at 103 bpm with mild ST depressions V5-V6    TELEMETRY: NSR, no events    RADIOLOGIC TESTIN24 Left hip/pelvis X-ray: There is mild impaction and varus angulation of intertrochanteric fracture of the left proximal femur. There are mild degenerative changes of the. There is no focal soft tissue abnormality.  24 Repeat left hip X-ray: Redemonstration of acute displaced and impacted left proximal intertrochanteric femoral fracture.    DIAGNOSTIC TESTIN/26/24 Echocardiogram (24): Left ventricular cavity is small. Left ventricular wall thickness is mildly increased. Left ventricular systolic function is normal with an ejection fraction of 65% by Madrigal's method of disks. There are no regional wall motion abnormalities seen. Normal right ventricular cavity size, with wall thickness, and normal systolic function. There is severe aortic stenosis. The peak transaortic velocity is 5.37 m/s, peak transaortic gradient is 115.3 mmHg and mean transaortic gradient is 69.0 mmHg with an LVOT/aortic valve VTI ratio of 0.18. The aortic valve area is estimated at 0.68 cm² by the continuity equation. There is mild to moderate aortic regurgitation. No pericardial effusion seen. No prior echocardiogram is available for comparison.  [ ] Catheterization: N/A  [ ] Stress Test: N/A

## 2024-02-27 NOTE — CONSULT NOTE ADULT - SUBJECTIVE AND OBJECTIVE BOX
CHIEF COMPLAINT:Patient is a 73y old  Female who presents with a chief complaint of L hip fx (26 Feb 2024 15:09)      HISTORY OF PRESENT ILLNESS:    73 female with history of HTN, HLD, DMII not on meds   admitted s/p mechanical fall now with left hip fx   she denies any chest pain, sob, palpitation, dizziness or syncope.  exercise capacity is less than 4 METs.    PAST MEDICAL & SURGICAL HISTORY:  DM2 (diabetes mellitus, type 2)      HTN (hypertension)      No significant past surgical history              MEDICATIONS:        acetaminophen     Tablet .. 975 milliGRAM(s) Oral every 8 hours  oxyCODONE    IR 5 milliGRAM(s) Oral every 4 hours PRN  oxyCODONE    IR 10 milliGRAM(s) Oral every 4 hours PRN    pantoprazole    Tablet 40 milliGRAM(s) Oral before breakfast  polyethylene glycol 3350 17 Gram(s) Oral daily  senna 2 Tablet(s) Oral at bedtime    dextrose 50% Injectable 25 Gram(s) IV Push once  dextrose 50% Injectable 12.5 Gram(s) IV Push once  dextrose 50% Injectable 25 Gram(s) IV Push once  dextrose Oral Gel 15 Gram(s) Oral once PRN  glucagon  Injectable 1 milliGRAM(s) IntraMuscular once  insulin lispro (ADMELOG) corrective regimen sliding scale   SubCutaneous three times a day before meals  insulin lispro (ADMELOG) corrective regimen sliding scale   SubCutaneous at bedtime  simvastatin 20 milliGRAM(s) Oral at bedtime    chlorhexidine 2% Cloths 1 Application(s) Topical <User Schedule>  dextrose 5%. 1000 milliLiter(s) IV Continuous <Continuous>  dextrose 5%. 1000 milliLiter(s) IV Continuous <Continuous>  lactated ringers. 1000 milliLiter(s) IV Continuous <Continuous>  povidone iodine 5% Nasal Swab 1 Application(s) Both Nostrils once      FAMILY HISTORY:      Non-contributory    SOCIAL HISTORY:    No tobacco, drugs or etoh    Allergies    No Known Allergies    Intolerances    	    REVIEW OF SYSTEMS:  as above  The rest of the 14 points ROS reviewed and except above they are unremarkable.        PHYSICAL EXAM:  T(C): 36.6 (02-27-24 @ 01:42), Max: 37.1 (02-26-24 @ 17:19)  HR: 87 (02-27-24 @ 01:42) (72 - 99)  BP: 98/61 (02-27-24 @ 01:42) (91/44 - 154/76)  RR: 18 (02-27-24 @ 01:42) (15 - 18)  SpO2: 99% (02-27-24 @ 01:42) (97% - 100%)  Wt(kg): --  I&O's Summary    JVP: Normal  Neck: supple  Lung: clear   CV: S1 S2 , Murmur: 4/6 eleno   Abd: soft  Ext: No edema  neuro: Awake / alert  Psych: flat affect  Skin: normal      LABS/DATA:    TELEMETRY: 	    ECG:  	sinus , diffuse st depression    	  CARDIAC MARKERS:      < from: TTE W or WO Ultrasound Enhancing Agent (02.26.24 @ 17:38) >  _________________________________________________________     CONCLUSIONS:      1. Left ventricular cavity is small. Left ventricular wall thickness is mildly increased. Left ventricular systolic function is normal with an ejection fraction of 65 % by Madrigal's method of disks. There are no regional wall motion abnormalities seen.   2. Normal right ventricular cavity size, with wall thickness, and normal systolic function.   3. There is severe aortic stenosis. The peak transaortic velocity is 5.37 m/s, peak transaortic gradient is 115.3 mmHg and mean transaortic gradient is 69.0 mmHg with an LVOT/aortic valve VTI ratio of 0.18. The aortic valve area is estimated at 0.68 cm² by the continuity equation. There is mild to moderate aortic regurgitation.         4. No pericardial effusion seen.   5. No prior echocardiogram is available for comparison.    ________________________________________________________________________________________    < end of copied text >                                  9.3    9.30  )-----------( 214      ( 27 Feb 2024 01:09 )             27.6     02-27    131<L>  |  96<L>  |  14  ----------------------------<  188<H>  4.0   |  22  |  0.61    Ca    9.4      27 Feb 2024 01:09    TPro  6.9  /  Alb  4.2  /  TBili  0.4  /  DBili  x   /  AST  26  /  ALT  13  /  AlkPhos  63  02-26    proBNP:   Lipid Profile:   HgA1c:   TSH:

## 2024-02-27 NOTE — CONSULT NOTE ADULT - ASSESSMENT
74 y/o female with a PMHx of HTN, HLD, DM and OA presented to ED s/p mechanical slip and fall, found to have an acute displaced and impacted left proximal intertrochanteric femoral fracture, with course complicated by severe/critical aortic stenosis.

## 2024-02-27 NOTE — CONSULT NOTE ADULT - PROBLEM SELECTOR RECOMMENDATION 9
- Unable to elicit if symptomatic from aortic stenosis given her exercise tolerance is limited by severe arthritis of knees  - EKG shows sinus tachycardia with mild ST depressions V5-V6  - Echo shows severe aortic stenosis (PIPPA 0.68 sqcm, pGr 115.3, mGr 69.0, EF 65%)  - Monitor on telemetry  - Pt is considered high risk for OR  - Will curbside cardiac anesthesia  - Pending final recs from attending - Unable to elicit if symptomatic from aortic stenosis given her exercise tolerance is limited by severe arthritis of knees  - EKG shows sinus tachycardia with mild ST depressions V5-V6  - Echo shows severe aortic stenosis (PIPPA 0.68 sqcm, pGr 115.3, mGr 69.0, EF 65%)  - Monitor on telemetry

## 2024-02-28 ENCOUNTER — APPOINTMENT (OUTPATIENT)
Dept: ORTHOPEDIC SURGERY | Facility: HOSPITAL | Age: 74
End: 2024-02-28

## 2024-02-28 LAB
ANION GAP SERPL CALC-SCNC: 14 MMOL/L — SIGNIFICANT CHANGE UP (ref 7–14)
ANION GAP SERPL CALC-SCNC: 9 MMOL/L — SIGNIFICANT CHANGE UP (ref 7–14)
APTT BLD: 29.5 SEC — SIGNIFICANT CHANGE UP (ref 24.5–35.6)
BASOPHILS # BLD AUTO: 0.02 K/UL — SIGNIFICANT CHANGE UP (ref 0–0.2)
BASOPHILS NFR BLD AUTO: 0.2 % — SIGNIFICANT CHANGE UP (ref 0–2)
BUN SERPL-MCNC: 10 MG/DL — SIGNIFICANT CHANGE UP (ref 7–23)
BUN SERPL-MCNC: 11 MG/DL — SIGNIFICANT CHANGE UP (ref 7–23)
CALCIUM SERPL-MCNC: 8.8 MG/DL — SIGNIFICANT CHANGE UP (ref 8.4–10.5)
CALCIUM SERPL-MCNC: 9 MG/DL — SIGNIFICANT CHANGE UP (ref 8.4–10.5)
CHLORIDE SERPL-SCNC: 101 MMOL/L — SIGNIFICANT CHANGE UP (ref 98–107)
CHLORIDE SERPL-SCNC: 102 MMOL/L — SIGNIFICANT CHANGE UP (ref 98–107)
CO2 SERPL-SCNC: 22 MMOL/L — SIGNIFICANT CHANGE UP (ref 22–31)
CO2 SERPL-SCNC: 26 MMOL/L — SIGNIFICANT CHANGE UP (ref 22–31)
CREAT SERPL-MCNC: 0.47 MG/DL — LOW (ref 0.5–1.3)
CREAT SERPL-MCNC: 0.62 MG/DL — SIGNIFICANT CHANGE UP (ref 0.5–1.3)
EGFR: 100 ML/MIN/1.73M2 — SIGNIFICANT CHANGE UP
EGFR: 93 ML/MIN/1.73M2 — SIGNIFICANT CHANGE UP
EOSINOPHIL # BLD AUTO: 0 K/UL — SIGNIFICANT CHANGE UP (ref 0–0.5)
EOSINOPHIL NFR BLD AUTO: 0 % — SIGNIFICANT CHANGE UP (ref 0–6)
GAS PNL BLDA: SIGNIFICANT CHANGE UP
GLUCOSE BLDC GLUCOMTR-MCNC: 140 MG/DL — HIGH (ref 70–99)
GLUCOSE BLDC GLUCOMTR-MCNC: 164 MG/DL — HIGH (ref 70–99)
GLUCOSE BLDC GLUCOMTR-MCNC: 170 MG/DL — HIGH (ref 70–99)
GLUCOSE SERPL-MCNC: 141 MG/DL — HIGH (ref 70–99)
GLUCOSE SERPL-MCNC: 160 MG/DL — HIGH (ref 70–99)
HCT VFR BLD CALC: 25.1 % — LOW (ref 34.5–45)
HCT VFR BLD CALC: 25.4 % — LOW (ref 34.5–45)
HGB BLD-MCNC: 8.4 G/DL — LOW (ref 11.5–15.5)
HGB BLD-MCNC: 8.6 G/DL — LOW (ref 11.5–15.5)
IANC: 9.56 K/UL — HIGH (ref 1.8–7.4)
IMM GRANULOCYTES NFR BLD AUTO: 0.8 % — SIGNIFICANT CHANGE UP (ref 0–0.9)
INR BLD: 1.11 RATIO — SIGNIFICANT CHANGE UP (ref 0.85–1.18)
LYMPHOCYTES # BLD AUTO: 0.48 K/UL — LOW (ref 1–3.3)
LYMPHOCYTES # BLD AUTO: 4.6 % — LOW (ref 13–44)
MAGNESIUM SERPL-MCNC: 1.4 MG/DL — LOW (ref 1.6–2.6)
MAGNESIUM SERPL-MCNC: 1.5 MG/DL — LOW (ref 1.6–2.6)
MCHC RBC-ENTMCNC: 30.4 PG — SIGNIFICANT CHANGE UP (ref 27–34)
MCHC RBC-ENTMCNC: 30.7 PG — SIGNIFICANT CHANGE UP (ref 27–34)
MCHC RBC-ENTMCNC: 33.5 GM/DL — SIGNIFICANT CHANGE UP (ref 32–36)
MCHC RBC-ENTMCNC: 33.9 GM/DL — SIGNIFICANT CHANGE UP (ref 32–36)
MCV RBC AUTO: 89.8 FL — SIGNIFICANT CHANGE UP (ref 80–100)
MCV RBC AUTO: 91.6 FL — SIGNIFICANT CHANGE UP (ref 80–100)
MONOCYTES # BLD AUTO: 0.22 K/UL — SIGNIFICANT CHANGE UP (ref 0–0.9)
MONOCYTES NFR BLD AUTO: 2.1 % — SIGNIFICANT CHANGE UP (ref 2–14)
NEUTROPHILS # BLD AUTO: 9.56 K/UL — HIGH (ref 1.8–7.4)
NEUTROPHILS NFR BLD AUTO: 92.3 % — HIGH (ref 43–77)
NRBC # BLD: 0 /100 WBCS — SIGNIFICANT CHANGE UP (ref 0–0)
NRBC # BLD: 0 /100 WBCS — SIGNIFICANT CHANGE UP (ref 0–0)
NRBC # FLD: 0 K/UL — SIGNIFICANT CHANGE UP (ref 0–0)
NRBC # FLD: 0 K/UL — SIGNIFICANT CHANGE UP (ref 0–0)
PHOSPHATE SERPL-MCNC: 3.2 MG/DL — SIGNIFICANT CHANGE UP (ref 2.5–4.5)
PHOSPHATE SERPL-MCNC: 4 MG/DL — SIGNIFICANT CHANGE UP (ref 2.5–4.5)
PLATELET # BLD AUTO: 170 K/UL — SIGNIFICANT CHANGE UP (ref 150–400)
PLATELET # BLD AUTO: 190 K/UL — SIGNIFICANT CHANGE UP (ref 150–400)
POTASSIUM SERPL-MCNC: 4 MMOL/L — SIGNIFICANT CHANGE UP (ref 3.5–5.3)
POTASSIUM SERPL-MCNC: 5.4 MMOL/L — HIGH (ref 3.5–5.3)
POTASSIUM SERPL-SCNC: 4 MMOL/L — SIGNIFICANT CHANGE UP (ref 3.5–5.3)
POTASSIUM SERPL-SCNC: 5.4 MMOL/L — HIGH (ref 3.5–5.3)
PROTHROM AB SERPL-ACNC: 12.5 SEC — SIGNIFICANT CHANGE UP (ref 9.5–13)
RBC # BLD: 2.74 M/UL — LOW (ref 3.8–5.2)
RBC # BLD: 2.83 M/UL — LOW (ref 3.8–5.2)
RBC # FLD: 12.2 % — SIGNIFICANT CHANGE UP (ref 10.3–14.5)
RBC # FLD: 12.4 % — SIGNIFICANT CHANGE UP (ref 10.3–14.5)
SODIUM SERPL-SCNC: 137 MMOL/L — SIGNIFICANT CHANGE UP (ref 135–145)
SODIUM SERPL-SCNC: 137 MMOL/L — SIGNIFICANT CHANGE UP (ref 135–145)
WBC # BLD: 10.36 K/UL — SIGNIFICANT CHANGE UP (ref 3.8–10.5)
WBC # BLD: 7.87 K/UL — SIGNIFICANT CHANGE UP (ref 3.8–10.5)
WBC # FLD AUTO: 10.36 K/UL — SIGNIFICANT CHANGE UP (ref 3.8–10.5)
WBC # FLD AUTO: 7.87 K/UL — SIGNIFICANT CHANGE UP (ref 3.8–10.5)

## 2024-02-28 PROCEDURE — 27245 TREAT THIGH FRACTURE: CPT | Mod: LT

## 2024-02-28 PROCEDURE — 99231 SBSQ HOSP IP/OBS SF/LOW 25: CPT

## 2024-02-28 DEVICE — NAIL TFNA 130DEG 10X170MM: Type: IMPLANTABLE DEVICE | Site: LEFT | Status: FUNCTIONAL

## 2024-02-28 DEVICE — SCREW LOKG 5X34MM: Type: IMPLANTABLE DEVICE | Site: LEFT | Status: FUNCTIONAL

## 2024-02-28 DEVICE — IMPLANTABLE DEVICE: Type: IMPLANTABLE DEVICE | Site: LEFT | Status: FUNCTIONAL

## 2024-02-28 RX ORDER — ONDANSETRON 8 MG/1
4 TABLET, FILM COATED ORAL ONCE
Refills: 0 | Status: DISCONTINUED | OUTPATIENT
Start: 2024-02-28 | End: 2024-02-29

## 2024-02-28 RX ORDER — HYDROMORPHONE HYDROCHLORIDE 2 MG/ML
0.5 INJECTION INTRAMUSCULAR; INTRAVENOUS; SUBCUTANEOUS
Refills: 0 | Status: DISCONTINUED | OUTPATIENT
Start: 2024-02-28 | End: 2024-03-01

## 2024-02-28 RX ORDER — ENOXAPARIN SODIUM 100 MG/ML
40 INJECTION SUBCUTANEOUS EVERY 24 HOURS
Refills: 0 | Status: DISCONTINUED | OUTPATIENT
Start: 2024-02-29 | End: 2024-03-01

## 2024-02-28 RX ORDER — FENTANYL CITRATE 50 UG/ML
25 INJECTION INTRAVENOUS
Refills: 0 | Status: DISCONTINUED | OUTPATIENT
Start: 2024-02-28 | End: 2024-02-29

## 2024-02-28 RX ORDER — CEFAZOLIN SODIUM 1 G
2000 VIAL (EA) INJECTION EVERY 8 HOURS
Refills: 0 | Status: COMPLETED | OUTPATIENT
Start: 2024-02-28 | End: 2024-02-29

## 2024-02-28 RX ADMIN — Medication 100 MILLIGRAM(S): at 21:52

## 2024-02-28 RX ADMIN — Medication 975 MILLIGRAM(S): at 06:10

## 2024-02-28 RX ADMIN — Medication 975 MILLIGRAM(S): at 21:52

## 2024-02-28 RX ADMIN — Medication 1: at 15:52

## 2024-02-28 RX ADMIN — Medication 975 MILLIGRAM(S): at 05:08

## 2024-02-28 RX ADMIN — Medication 1: at 06:01

## 2024-02-28 RX ADMIN — Medication 1 APPLICATION(S): at 10:30

## 2024-02-28 RX ADMIN — Medication 2: at 21:51

## 2024-02-28 RX ADMIN — PANTOPRAZOLE SODIUM 40 MILLIGRAM(S): 20 TABLET, DELAYED RELEASE ORAL at 05:08

## 2024-02-28 RX ADMIN — HYDROMORPHONE HYDROCHLORIDE 0.5 MILLIGRAM(S): 2 INJECTION INTRAMUSCULAR; INTRAVENOUS; SUBCUTANEOUS at 16:00

## 2024-02-28 RX ADMIN — HYDROMORPHONE HYDROCHLORIDE 0.5 MILLIGRAM(S): 2 INJECTION INTRAMUSCULAR; INTRAVENOUS; SUBCUTANEOUS at 15:51

## 2024-02-28 RX ADMIN — OXYCODONE HYDROCHLORIDE 10 MILLIGRAM(S): 5 TABLET ORAL at 05:08

## 2024-02-28 RX ADMIN — OXYCODONE HYDROCHLORIDE 5 MILLIGRAM(S): 5 TABLET ORAL at 19:00

## 2024-02-28 RX ADMIN — HYDROMORPHONE HYDROCHLORIDE 0.5 MILLIGRAM(S): 2 INJECTION INTRAMUSCULAR; INTRAVENOUS; SUBCUTANEOUS at 16:14

## 2024-02-28 RX ADMIN — SIMVASTATIN 20 MILLIGRAM(S): 20 TABLET, FILM COATED ORAL at 21:52

## 2024-02-28 RX ADMIN — SODIUM CHLORIDE 100 MILLILITER(S): 9 INJECTION, SOLUTION INTRAVENOUS at 21:53

## 2024-02-28 RX ADMIN — CHLORHEXIDINE GLUCONATE 1 APPLICATION(S): 213 SOLUTION TOPICAL at 10:35

## 2024-02-28 RX ADMIN — OXYCODONE HYDROCHLORIDE 10 MILLIGRAM(S): 5 TABLET ORAL at 06:10

## 2024-02-28 RX ADMIN — HYDROMORPHONE HYDROCHLORIDE 0.5 MILLIGRAM(S): 2 INJECTION INTRAMUSCULAR; INTRAVENOUS; SUBCUTANEOUS at 16:30

## 2024-02-28 RX ADMIN — OXYCODONE HYDROCHLORIDE 5 MILLIGRAM(S): 5 TABLET ORAL at 18:03

## 2024-02-28 NOTE — PROGRESS NOTE ADULT - SUBJECTIVE AND OBJECTIVE BOX
Orthopedics Post-Op Check:  Patient was seen and examined at bedside. Denies CP/SOB/Dizziness, N/V/D, HA. Pain is controlled.     Vital Signs Last 24 Hrs  T(C): 36.9 (28 Feb 2024 15:40), Max: 37.6 (27 Feb 2024 21:33)  T(F): 98.4 (28 Feb 2024 15:40), Max: 99.7 (27 Feb 2024 21:33)  HR: 79 (28 Feb 2024 16:00) (79 - 98)  BP: 131/53 (28 Feb 2024 16:00) (126/51 - 157/61)  BP(mean): 72 (28 Feb 2024 16:00) (69 - 79)  RR: 16 (28 Feb 2024 16:00) (16 - 18)  SpO2: 96% (28 Feb 2024 16:00) (94% - 100%)    Parameters below as of 28 Feb 2024 16:00  Patient On (Oxygen Delivery Method): nasal cannula  O2 Flow (L/min): 3    Labs:                        8.4    7.87  )-----------( 190      ( 28 Feb 2024 01:11 )             25.1     02-28    137  |  102  |  11  ----------------------------<  141<H>  4.0   |  26  |  0.62    Ca    9.0      28 Feb 2024 01:11  Phos  3.2     02-28  Mg     1.50     02-28    Physical Exam:  Gen: NAD  L LE:   Dressing C/D/I  Motor intact + EHL/FHL/TA/GS. Sensation is grossly intact.   Compartments are soft, extremities are warm, DP 2+

## 2024-02-28 NOTE — BRIEF OPERATIVE NOTE - NSICDXBRIEFPROCEDURE_GEN_ALL_CORE_FT
PROCEDURES:  Open reduction and internal fixation of left hip using interlocking intramedullary nail 28-Feb-2024 16:08:53  Anisa Rowley

## 2024-02-28 NOTE — PROGRESS NOTE ADULT - SUBJECTIVE AND OBJECTIVE BOX
Pt seen and evaluated at bedside, reports that her left groin pain is improved with Oxycodone. Pt has no other complaints. No events overnight.      Medications:  acetaminophen Tablet 975 milliGRAM(s) Oral every 8 hours  chlorhexidine 2% Cloths 1 Application(s) Topical <User Schedule>  chlorhexidine 2% Cloths 1 Application(s) Topical once  dextrose 5%. 1000 milliLiter(s) IV Continuous <Continuous>  dextrose 5%. 1000 milliLiter(s) IV Continuous <Continuous>  dextrose 50% Injectable 25 Gram(s) IV Push once  dextrose 50% Injectable 25 Gram(s) IV Push once  dextrose 50% Injectable 12.5 Gram(s) IV Push once  dextrose Oral Gel 15 Gram(s) Oral once PRN  glucagon  Injectable 1 milliGRAM(s) IntraMuscular once  influenza  Vaccine (HIGH DOSE) 0.7 milliLiter(s) IntraMuscular once  insulin lispro (ADMELOG) corrective regimen sliding scale   SubCutaneous at bedtime  insulin lispro (ADMELOG) corrective regimen sliding scale   SubCutaneous three times a day before meals  lactated ringers. 1000 milliLiter(s) IV Continuous <Continuous>  oxyCODONE  IR 5 milliGRAM(s) Oral every 4 hours PRN  oxyCODONE  IR 10 milliGRAM(s) Oral every 4 hours PRN  pantoprazole Tablet 40 milliGRAM(s) Oral before breakfast  polyethylene glycol 3350 17 Gram(s) Oral daily  povidone iodine 5% Nasal Swab 1 Application(s) Both Nostrils once  senna 2 Tablet(s) Oral at bedtime  simvastatin 20 milliGRAM(s) Oral at bedtime      Vitals:  Vital Signs Last 24 Hrs  T(C): 37.1 (2024 09:35), Max: 37.6 (2024 21:33)  T(F): 98.7 (2024 09:35), Max: 99.7 (2024 21:33)  HR: 97 (2024 09:35) (82 - 97)  BP: 132/68 (2024 09:35) (120/64 - 144/66)  BP(mean): --  RR: 18 (2024 09:35) (16 - 18)  SpO2: 100% (2024 09:35) (96% - 100%)    Parameters below as of 2024 09:35  Patient On (Oxygen Delivery Method): room air        Daily     Daily     I&O's Detail    2024 07:01  -  2024 07:00  --------------------------------------------------------  IN:    Lactated Ringers: 1000 mL  Total IN: 1000 mL    OUT:    Voided (mL): 1000 mL  Total OUT: 1000 mL    Total NET: 0 mL          Physical Examination:     Appearance: Normal. No distress.  HEENT: Normal oral mucosa.  Cardiovascular: Normal S1 and S2. No JVD. III/VI blowing systolic murmur.  Respiratory: Lungs clear to auscultation. No rhonchi, rales, wheezing.  Gastrointestinal: Soft. Non-tender. Non-distended. Normal bowel sounds.  Skin: No rashes. No ecchymoses. No cyanosis.	  Neurologic: A&Ox3. Non-focal.  Musculoskeletal: Unable to assess LLE. Other extremities with good strength/ROM and sensation.  Extremities: Warm and well perfused. 2+ pulses bilaterally. No edema.   Psychiatry: Mood and affect appropriate.      Labs:                        8.4    7.87  )-----------( 190      ( 2024 01:11 )             25.1         137  |  102  |  11  ----------------------------<  141<H>  4.0   |  26  |  0.62    Ca    9.0      2024 01:11  Phos  3.2       Mg     1.50         TPro  6.9  /  Alb  4.2  /  TBili  0.4  /  DBili  x   /  AST  26  /  ALT  13  /  AlkPhos  63      PT/INR - ( 2024 01:11 )   PT: 12.5 sec;   INR: 1.11 ratio         PTT - ( 2024 01:11 )  PTT:29.5 sec      ECG: Sinus tachycardia at 103 bpm with mild ST depressions V5-V6    Telemetry: NSR, no events    RADIOLOGIC TESTIN24 Left hip/pelvis X-ray: There is mild impaction and varus angulation of intertrochanteric fracture of the left proximal femur. There are mild degenerative changes of the. There is no focal soft tissue abnormality.  24 Repeat left hip X-ray: Re-demonstration of acute displaced and impacted left proximal intertrochanteric femoral fracture.    DIAGNOSTIC TESTIN/26/24 Echocardiogram (24): Left ventricular cavity is small. Left ventricular wall thickness is mildly increased. Left ventricular systolic function is normal with an ejection fraction of 65% by Madrigal's method of disks. There are no regional wall motion abnormalities seen. Normal right ventricular cavity size, with wall thickness, and normal systolic function. There is severe aortic stenosis. The peak transaortic velocity is 5.37 m/s, peak transaortic gradient is 115.3 mmHg and mean transaortic gradient is 69.0 mmHg with an LVOT/aortic valve VTI ratio of 0.18. The aortic valve area is estimated at 0.68 cm² by the continuity equation. There is mild to moderate aortic regurgitation. No pericardial effusion seen. No prior echocardiogram is available for comparison.

## 2024-02-28 NOTE — PROGRESS NOTE ADULT - ATTENDING COMMENTS
Patient seen and examined. Agree with PA assesment and plan. No acute events overnight, pain is well controlled.  Patient denies any chest pain, SOB, N/V, fevers/chills.    ** Discussions had with Cards/Interventional Cards re: preoperative risk stratification for surgical L hip IMN. Appreciate detailed note/clearance and consideration of anabel-operative care. Patient currently medically optimized for surgery, with known high risk due to severe AS. Will coordinate post-operative care with Cardiology with close follow up for TAVR. Anesthesiology informed of recommendations of cardiac anesthesia and consulted 2/27, and have been actively involved in patient care/discussion periop risk. **    Physical exam:  Gen: Alert and Oriented x3, No Acute Distress  Left lower EXT:            Skin in tact, leg shortened and externally rotated            Motor: EHL FHL TA GA SOL in tact            Sensation: SILT            Pulses: 2+ DP    L IT fracture  - Discussed plan of care with patient and family member at bedside. Electing to proceed with scheduled IMN today with current risk profile, consent obtained. Anesthesia aware.   - NPO, PT post-op  - Will follow post-op  - Plan Telemetry  - DVT ppx post-op

## 2024-02-28 NOTE — PROGRESS NOTE ADULT - SUBJECTIVE AND OBJECTIVE BOX
Subjective: Patient seen and examined. No new events except as noted.     SUBJECTIVE/ROS:  feels ok   No chest pain, dyspnea, palpitation, or dizziness.       MEDICATIONS:  MEDICATIONS  (STANDING):  acetaminophen     Tablet .. 975 milliGRAM(s) Oral every 8 hours  chlorhexidine 2% Cloths 1 Application(s) Topical once  chlorhexidine 2% Cloths 1 Application(s) Topical <User Schedule>  dextrose 5%. 1000 milliLiter(s) (50 mL/Hr) IV Continuous <Continuous>  dextrose 5%. 1000 milliLiter(s) (100 mL/Hr) IV Continuous <Continuous>  dextrose 50% Injectable 12.5 Gram(s) IV Push once  dextrose 50% Injectable 25 Gram(s) IV Push once  dextrose 50% Injectable 25 Gram(s) IV Push once  glucagon  Injectable 1 milliGRAM(s) IntraMuscular once  influenza  Vaccine (HIGH DOSE) 0.7 milliLiter(s) IntraMuscular once  insulin lispro (ADMELOG) corrective regimen sliding scale   SubCutaneous three times a day before meals  insulin lispro (ADMELOG) corrective regimen sliding scale   SubCutaneous at bedtime  lactated ringers. 1000 milliLiter(s) (100 mL/Hr) IV Continuous <Continuous>  pantoprazole    Tablet 40 milliGRAM(s) Oral before breakfast  polyethylene glycol 3350 17 Gram(s) Oral daily  povidone iodine 5% Nasal Swab 1 Application(s) Both Nostrils once  senna 2 Tablet(s) Oral at bedtime  simvastatin 20 milliGRAM(s) Oral at bedtime      PHYSICAL EXAM:  T(C): 36.9 (02-28-24 @ 05:13), Max: 37.6 (02-27-24 @ 21:33)  HR: 90 (02-28-24 @ 05:13) (82 - 96)  BP: 144/66 (02-28-24 @ 05:13) (120/64 - 144/66)  RR: 17 (02-28-24 @ 05:13) (16 - 17)  SpO2: 100% (02-28-24 @ 05:13) (96% - 100%)  Wt(kg): --  I&O's Summary    27 Feb 2024 07:01  -  28 Feb 2024 07:00  --------------------------------------------------------  IN: 1000 mL / OUT: 1000 mL / NET: 0 mL            JVP: Normal  Neck: supple  Lung: clear   CV: S1 S2 , Murmur: pos eleno   Abd: soft  Ext: No edema  neuro: Awake / alert  Psych: flat affect  Skin: normal``    LABS/DATA:    CARDIAC MARKERS:                                8.4    7.87  )-----------( 190      ( 28 Feb 2024 01:11 )             25.1     02-28    137  |  102  |  11  ----------------------------<  141<H>  4.0   |  26  |  0.62    Ca    9.0      28 Feb 2024 01:11  Phos  3.2     02-28  Mg     1.50     02-28    TPro  6.9  /  Alb  4.2  /  TBili  0.4  /  DBili  x   /  AST  26  /  ALT  13  /  AlkPhos  63  02-26    proBNP:   Lipid Profile:   HgA1c:   TSH:     TELE:  EKG:

## 2024-02-28 NOTE — PROGRESS NOTE ADULT - PROBLEM SELECTOR PLAN 1
- Unable to elicit if symptomatic from AS given her exercise tolerance is limited by severe arthritis of knees  - Echo shows severe aortic stenosis (PIPPA 0.68 sqcm, pGr 115.3, mGr 69.0, EF 65%)  - Patient is considered higher than average patient for having adverse cardiovascular events due to fixed obstructive valve condition  - Perioperative management should be managed by cardiac anesthesiologist with special focus on minimizing blood pressure and volume shifts  - Post operative management should include telemetry vs SICU if necessary  - Pt will need further workup and TAVR/SAVR for aortic stenosis depending on her clinical course, which will likely be done electively once she has recovered from her hip surgery  - Please call us with any perioperative issues; we will follow the patient in the postop period

## 2024-02-28 NOTE — PROGRESS NOTE ADULT - SUBJECTIVE AND OBJECTIVE BOX
Orthopedic Progress Note     S:  No acute events overnight, pain is well controlled.  Patient denies any chest pain, SOB, N/V, fevers/chills.    T(C): 37.2 (02-28-24 @ 11:04), Max: 37.6 (02-27-24 @ 21:33)  HR: 98 (02-28-24 @ 11:04) (82 - 98)  BP: 157/61 (02-28-24 @ 11:04) (126/51 - 157/61)  RR: 17 (02-28-24 @ 11:04) (16 - 18)  SpO2: 98% (02-28-24 @ 11:04) (97% - 100%)  Wt(kg): --I&O's Summary    27 Feb 2024 07:01  -  28 Feb 2024 07:00  --------------------------------------------------------  IN: 1000 mL / OUT: 1000 mL / NET: 0 mL        O:  Physical exam:  Gen: Alert and Oriented x3, No Acute Distress  Left lower EXT:            Skin in tact, leg shortened and externally rotated            Motor: EHL FHL TA GA SOL in tact            Sensation: SILT            Pulses: 2+ DP           Labs:                        8.4    7.87  )-----------( 190      ( 28 Feb 2024 01:11 )             25.1    02-28    137  |  102  |  11  ----------------------------<  141<H>  4.0   |  26  |  0.62    Ca    9.0      28 Feb 2024 01:11  Phos  3.2     02-28  Mg     1.50     02-28    TPro  6.9  /  Alb  4.2  /  TBili  0.4  /  DBili  x   /  AST  26  /  ALT  13  /  AlkPhos  63  02-26

## 2024-02-28 NOTE — PRE-OP CHECKLIST - SELECT TESTS ORDERED
BMP/CBC/PT/PTT/INR/Type and Screen/Urinalysis/POCT Blood Glucose fsbs/BMP/CBC/PT/PTT/INR/Type and Screen/Urinalysis/POCT Blood Glucose [id number] at 10:51am/BMP/CBC/PT/PTT/INR/Type and Screen/Urinalysis/POCT Blood Glucose

## 2024-02-28 NOTE — PRE-OP CHECKLIST - BOWEL PREP
5/1/2023         RE: Sudhir Her  [id number] [facility] [id number]    To Whom It May Concern     Please excuse Sudhir from work for today  He was getting a pre-op done for his upcoming knee surgery   This is scheduled for 5/26/23   He is medically cleared for this and is planning to go forward with it   We estimate his rehab process will be a 6-8 week process but will depend on his surgery and further thoughts from orthopedic surgeon     Please notify me with any questions.    Best,     Jeanine Edmond, DO   n/a

## 2024-02-29 PROBLEM — I10 ESSENTIAL (PRIMARY) HYPERTENSION: Chronic | Status: ACTIVE | Noted: 2024-02-26

## 2024-02-29 LAB
ANION GAP SERPL CALC-SCNC: 13 MMOL/L — SIGNIFICANT CHANGE UP (ref 7–14)
BUN SERPL-MCNC: 15 MG/DL — SIGNIFICANT CHANGE UP (ref 7–23)
CALCIUM SERPL-MCNC: 9.2 MG/DL — SIGNIFICANT CHANGE UP (ref 8.4–10.5)
CHLORIDE SERPL-SCNC: 99 MMOL/L — SIGNIFICANT CHANGE UP (ref 98–107)
CO2 SERPL-SCNC: 25 MMOL/L — SIGNIFICANT CHANGE UP (ref 22–31)
CREAT SERPL-MCNC: 0.57 MG/DL — SIGNIFICANT CHANGE UP (ref 0.5–1.3)
EGFR: 95 ML/MIN/1.73M2 — SIGNIFICANT CHANGE UP
GLUCOSE BLDC GLUCOMTR-MCNC: 160 MG/DL — HIGH (ref 70–99)
GLUCOSE BLDC GLUCOMTR-MCNC: 174 MG/DL — HIGH (ref 70–99)
GLUCOSE BLDC GLUCOMTR-MCNC: 174 MG/DL — HIGH (ref 70–99)
GLUCOSE BLDC GLUCOMTR-MCNC: 182 MG/DL — HIGH (ref 70–99)
GLUCOSE BLDC GLUCOMTR-MCNC: 214 MG/DL — HIGH (ref 70–99)
GLUCOSE BLDC GLUCOMTR-MCNC: 326 MG/DL — HIGH (ref 70–99)
GLUCOSE SERPL-MCNC: 180 MG/DL — HIGH (ref 70–99)
HCT VFR BLD CALC: 26.5 % — LOW (ref 34.5–45)
HGB BLD-MCNC: 9.1 G/DL — LOW (ref 11.5–15.5)
MCHC RBC-ENTMCNC: 31.3 PG — SIGNIFICANT CHANGE UP (ref 27–34)
MCHC RBC-ENTMCNC: 34.3 GM/DL — SIGNIFICANT CHANGE UP (ref 32–36)
MCV RBC AUTO: 91.1 FL — SIGNIFICANT CHANGE UP (ref 80–100)
NRBC # BLD: 0 /100 WBCS — SIGNIFICANT CHANGE UP (ref 0–0)
NRBC # FLD: 0 K/UL — SIGNIFICANT CHANGE UP (ref 0–0)
PLATELET # BLD AUTO: 201 K/UL — SIGNIFICANT CHANGE UP (ref 150–400)
POTASSIUM SERPL-MCNC: 3.8 MMOL/L — SIGNIFICANT CHANGE UP (ref 3.5–5.3)
POTASSIUM SERPL-SCNC: 3.8 MMOL/L — SIGNIFICANT CHANGE UP (ref 3.5–5.3)
RBC # BLD: 2.91 M/UL — LOW (ref 3.8–5.2)
RBC # FLD: 12.2 % — SIGNIFICANT CHANGE UP (ref 10.3–14.5)
SODIUM SERPL-SCNC: 137 MMOL/L — SIGNIFICANT CHANGE UP (ref 135–145)
WBC # BLD: 8.89 K/UL — SIGNIFICANT CHANGE UP (ref 3.8–10.5)
WBC # FLD AUTO: 8.89 K/UL — SIGNIFICANT CHANGE UP (ref 3.8–10.5)

## 2024-02-29 RX ORDER — ALBUMIN HUMAN 25 %
100 VIAL (ML) INTRAVENOUS ONCE
Refills: 0 | Status: DISCONTINUED | OUTPATIENT
Start: 2024-02-29 | End: 2024-02-29

## 2024-02-29 RX ADMIN — Medication 1: at 07:16

## 2024-02-29 RX ADMIN — Medication 975 MILLIGRAM(S): at 06:44

## 2024-02-29 RX ADMIN — Medication 100 MILLIGRAM(S): at 06:25

## 2024-02-29 RX ADMIN — Medication 975 MILLIGRAM(S): at 13:30

## 2024-02-29 RX ADMIN — SIMVASTATIN 20 MILLIGRAM(S): 20 TABLET, FILM COATED ORAL at 21:39

## 2024-02-29 RX ADMIN — PANTOPRAZOLE SODIUM 40 MILLIGRAM(S): 20 TABLET, DELAYED RELEASE ORAL at 07:16

## 2024-02-29 RX ADMIN — Medication 1: at 17:24

## 2024-02-29 RX ADMIN — Medication 1: at 13:29

## 2024-02-29 RX ADMIN — Medication 975 MILLIGRAM(S): at 22:08

## 2024-02-29 RX ADMIN — Medication 975 MILLIGRAM(S): at 06:22

## 2024-02-29 RX ADMIN — Medication 975 MILLIGRAM(S): at 21:38

## 2024-02-29 NOTE — PHYSICAL THERAPY INITIAL EVALUATION ADULT - GENERAL OBSERVATIONS, REHAB EVAL
Pt encountered in semisupine position on stretcher in PACU, no distress, AxOx4, with +IV, left hip dressing dry/intact, +tele, and +pulse oximeter. Pt agreeable to participate in PT evaluation.

## 2024-02-29 NOTE — PHYSICAL THERAPY INITIAL EVALUATION ADULT - ADDITIONAL COMMENTS
Pt reports that she lives in a private house in [place] with her  with ~6 steps to enter; (+)bilateral handrails; bedroom/bathroom is on the first level. Prior to hospital admission, pt was completely independent and used no assistive device with ambulation.    Pt left comfortable seated in reclined chair, NAD, all lines intact, all precautions maintained, and RN aware of PT evaluation.

## 2024-02-29 NOTE — PROGRESS NOTE ADULT - SUBJECTIVE AND OBJECTIVE BOX
Subjective: Patient seen and examined. No new events except as noted.     SUBJECTIVE/ROS:  s/p surgery   feels well   No chest pain, dyspnea, palpitation, or dizziness.       MEDICATIONS:  MEDICATIONS  (STANDING):  acetaminophen     Tablet .. 975 milliGRAM(s) Oral every 8 hours  chlorhexidine 2% Cloths 1 Application(s) Topical <User Schedule>  dextrose 5%. 1000 milliLiter(s) (100 mL/Hr) IV Continuous <Continuous>  dextrose 5%. 1000 milliLiter(s) (50 mL/Hr) IV Continuous <Continuous>  dextrose 50% Injectable 25 Gram(s) IV Push once  dextrose 50% Injectable 12.5 Gram(s) IV Push once  dextrose 50% Injectable 25 Gram(s) IV Push once  enoxaparin Injectable 40 milliGRAM(s) SubCutaneous every 24 hours  glucagon  Injectable 1 milliGRAM(s) IntraMuscular once  influenza  Vaccine (HIGH DOSE) 0.7 milliLiter(s) IntraMuscular once  insulin lispro (ADMELOG) corrective regimen sliding scale   SubCutaneous at bedtime  insulin lispro (ADMELOG) corrective regimen sliding scale   SubCutaneous three times a day before meals  lactated ringers. 1000 milliLiter(s) (100 mL/Hr) IV Continuous <Continuous>  pantoprazole    Tablet 40 milliGRAM(s) Oral before breakfast  polyethylene glycol 3350 17 Gram(s) Oral daily  senna 2 Tablet(s) Oral at bedtime  simvastatin 20 milliGRAM(s) Oral at bedtime      PHYSICAL EXAM:  T(C): 37 (02-29-24 @ 07:00), Max: 37.2 (02-28-24 @ 11:04)  HR: 97 (02-29-24 @ 08:00) (73 - 106)  BP: 137/93 (02-29-24 @ 08:00) (91/36 - 157/61)  RR: 19 (02-29-24 @ 08:00) (13 - 29)  SpO2: 98% (02-29-24 @ 08:00) (94% - 100%)  Wt(kg): --  I&O's Summary    28 Feb 2024 07:01  -  29 Feb 2024 07:00  --------------------------------------------------------  IN: 1840 mL / OUT: 1350 mL / NET: 490 mL      Height (cm): 157.5 (02-28 @ 11:12)  Weight (kg): 64.4 (02-28 @ 11:12)  BMI (kg/m2): 26 (02-28 @ 11:12)  BSA (m2): 1.65 (02-28 @ 11:12)      JVP: Normal  Neck: supple  Lung: clear   CV: S1 S2 , Murmur:  Abd: soft  Ext: No edema  neuro: Awake / alert  Psych: flat affect  Skin: normal``    LABS/DATA:    CARDIAC MARKERS:                                9.1    8.89  )-----------( 201      ( 29 Feb 2024 06:00 )             26.5     02-29    137  |  99  |  15  ----------------------------<  180<H>  3.8   |  25  |  0.57    Ca    9.2      29 Feb 2024 06:00  Phos  4.0     02-28  Mg     1.40     02-28      proBNP:   Lipid Profile:   HgA1c:   TSH:     TELE:  EKG:

## 2024-02-29 NOTE — PHYSICAL THERAPY INITIAL EVALUATION ADULT - PASSIVE RANGE OF MOTION EXAMINATION, REHAB EVAL
except right knee ~0-75 degrees/bilateral upper extremity Passive ROM was WFL (within functional limits)/bilateral lower extremity Passive ROM was WFL (within functional limits)

## 2024-02-29 NOTE — PHYSICAL THERAPY INITIAL EVALUATION ADULT - PATIENT PROFILE REVIEW, REHAB EVAL
ACTIVITY ORDER: OOB to Chair; Spoke with LILI Bishop prior to PT evaluation-->Pt OK for PT consult/OOB activity; vitals taken; /51mmHg./yes

## 2024-02-29 NOTE — CHART NOTE - NSCHARTNOTEFT_GEN_A_CORE
pt is post-op - no events  per cards out-pt f.u w structural heart for AS  PT rec home w home PT  no objection to dc planning from medicine perspective

## 2024-02-29 NOTE — PROGRESS NOTE ADULT - SUBJECTIVE AND OBJECTIVE BOX
ANESTHESIA POSTOP CHECK    73y Female POSTOP DAY 1 S/P left hip IM Nail    Vital Signs Last 24 Hrs  T(C): 36.9 (29 Feb 2024 09:00), Max: 37 (29 Feb 2024 00:00)  T(F): 98.4 (29 Feb 2024 09:00), Max: 98.6 (29 Feb 2024 00:00)  HR: 100 (29 Feb 2024 11:00) (73 - 106)  BP: 135/59 (29 Feb 2024 11:00) (91/36 - 145/60)  BP(mean): 764 (29 Feb 2024 11:00) (48 - 764)  RR: 19 (29 Feb 2024 11:00) (13 - 29)  SpO2: 99% (29 Feb 2024 11:00) (94% - 100%)    Parameters below as of 29 Feb 2024 11:00  Patient On (Oxygen Delivery Method): room air      I&O's Summary    28 Feb 2024 07:01  -  29 Feb 2024 07:00  --------------------------------------------------------  IN: 1840 mL / OUT: 1350 mL / NET: 490 mL    29 Feb 2024 07:01  -  29 Feb 2024 11:50  --------------------------------------------------------  IN: 400 mL / OUT: 350 mL / NET: 50 mL        [X] NO APPARENT ANESTHESIA COMPLICATIONS      Comments:

## 2024-02-29 NOTE — OCCUPATIONAL THERAPY INITIAL EVALUATION ADULT - GENERAL OBSERVATIONS, REHAB EVAL
Patient received semisupine in bed in NAD; agreeable to participate in OT evaluation. +tele.  bpm. /51 mmHg.

## 2024-02-29 NOTE — PHYSICAL THERAPY INITIAL EVALUATION ADULT - PERTINENT HX OF CURRENT PROBLEM, REHAB EVAL
73yFemale with PMH of DM and HTN presents to [facility] c/o Left hip pain status post mechanical fall at home. Patient denies head hit or LOC. Patient denies numbness or tingling in the LLE. Patient denies any other injuries.

## 2024-02-29 NOTE — PROGRESS NOTE ADULT - SUBJECTIVE AND OBJECTIVE BOX
Patient seen and examined at bedside, states she is feeling "a lot better and left groin pain is resolved".   Denies CP, SOB, palpitations or dizziness    No significant events overnight      REVIEW OF SYSTEMS:  Constitutional:     [ ] negative [ ] fevers [ ] chills [ ] weight loss [ ] weight gain  HEENT:                  [ ] negative [ ] dry eyes [ ] eye irritation [ ] postnasal drip [ ] nasal congestion  CV:                         [ ] negative  [ ] chest pain [ ] orthopnea [ ] palpitations [ ] murmur  Resp:                     [ ] negative [ ] cough [ ] shortness of breath [ ] dyspnea [ ] wheezing [ ] sputum [ ]hemoptysis  GI:                          [ ] negative [ ] nausea [ ] vomiting [ ] diarrhea [ ] constipation [ ] abd pain [ ] dysphagia   :                        [ ] negative [ ] dysuria [ ] nocturia [ ] hematuria [ ] increased urinary frequency  Musculoskeletal: [ ] negative [ ] back pain [ ] myalgias [ ] arthralgias [ ] fracture  Skin:                       [ ] negative [ ] rash [ ] itch  Neurological:        [ ] negative [ ] headache [ ] dizziness [ ] syncope [ ] weakness [ ] numbness  Psychiatric:           [ ] negative [ ] anxiety [ ] depression  Endocrine:            [ ] negative [ ] diabetes [ ] thyroid problem  Heme/Lymph:      [ ] negative [ ] anemia [ ] bleeding problem  Allergic/Immune: [ ] negative [ ] itchy eyes [ ] nasal discharge [ ] hives [ ] angioedema    [x] All other systems negative  [ ] Unable to assess ROS due to    Current Meds:  acetaminophen     Tablet .. 975 milliGRAM(s) Oral every 8 hours  chlorhexidine 2% Cloths 1 Application(s) Topical <User Schedule>  dextrose 5%. 1000 milliLiter(s) IV Continuous <Continuous>  dextrose 5%. 1000 milliLiter(s) IV Continuous <Continuous>  dextrose 50% Injectable 12.5 Gram(s) IV Push once  dextrose 50% Injectable 25 Gram(s) IV Push once  dextrose 50% Injectable 25 Gram(s) IV Push once  dextrose Oral Gel 15 Gram(s) Oral once PRN  enoxaparin Injectable 40 milliGRAM(s) SubCutaneous every 24 hours  fentaNYL    Injectable 25 MICROGram(s) IV Push every 5 minutes PRN  glucagon  Injectable 1 milliGRAM(s) IntraMuscular once  HYDROmorphone  Injectable 0.5 milliGRAM(s) IV Push every 10 minutes PRN  influenza  Vaccine (HIGH DOSE) 0.7 milliLiter(s) IntraMuscular once  insulin lispro (ADMELOG) corrective regimen sliding scale   SubCutaneous three times a day before meals  insulin lispro (ADMELOG) corrective regimen sliding scale   SubCutaneous at bedtime  lactated ringers. 1000 milliLiter(s) IV Continuous <Continuous>  ondansetron Injectable 4 milliGRAM(s) IV Push once PRN  oxyCODONE    IR 5 milliGRAM(s) Oral every 4 hours PRN  oxyCODONE    IR 10 milliGRAM(s) Oral every 4 hours PRN  pantoprazole    Tablet 40 milliGRAM(s) Oral before breakfast  polyethylene glycol 3350 17 Gram(s) Oral daily  senna 2 Tablet(s) Oral at bedtime  simvastatin 20 milliGRAM(s) Oral at bedtime      PAST MEDICAL & SURGICAL HISTORY:  DM2 (diabetes mellitus, type 2)      HTN (hypertension)      No significant past surgical history          Vitals:  T(F): 98.4 (), Max: 98.9 ()  HR: 96 () (73 - 106)  BP: 130/51 () (91/36 - 157/61)  RR: 14 ()  SpO2: 98% ()  I&O's Summary    2024 07:  -  2024 07:00  --------------------------------------------------------  IN: 1840 mL / OUT: 1350 mL / NET: 490 mL    2024 07:  -  2024 09:31  --------------------------------------------------------  IN: 200 mL / OUT: 350 mL / NET: -150 mL        Physical Exam:  Appearance: No acute distress; well appearing  Eyes: PERRL, EOMI, pink conjunctiva  HENT: Normal oral mucosa  Cardiovascular: III/IV, blowing holosystolic ejection murmur,   Respiratory: Clear to auscultation bilaterally  Gastrointestinal: soft, non-tender, non-distended with normal bowel sounds  Musculoskeletal: No clubbing; no joint deformity   Neurologic: Non-focal  Lymphatic: No lymphadenopathy  Psychiatry: AAOx3, mood & affect appropriate  Skin: No rashes, ecchymoses, or cyanosis                          9.1    8.89  )-----------( 201      ( 2024 06:00 )             26.5     02    137  |  99  |  15  ----------------------------<  180<H>  3.8   |  25  |  0.57    Ca    9.2      2024 06:00  Phos  4.0       Mg     1.40           PT/INR - ( 2024 01:11 )   PT: 12.5 sec;   INR: 1.11 ratio         PTT - ( 2024 01:11 )  PTT:29.5 sec      RADIOLOGIC TESTIN24 Left hip/pelvis X-ray: There is mild impaction and varus angulation of intertrochanteric fracture of the left proximal femur. There are mild degenerative changes of the. There is no focal soft tissue abnormality.  24 Repeat left hip X-ray: Re-demonstration of acute displaced and impacted left proximal intertrochanteric femoral fracture.    DIAGNOSTIC TESTIN/26/24 Echocardiogram (24): Left ventricular cavity is small. Left ventricular wall thickness is mildly increased. Left ventricular systolic function is normal with an ejection fraction of 65% by Madrigal's method of disks. There are no regional wall motion abnormalities seen. Normal right ventricular cavity size, with wall thickness, and normal systolic function. There is severe aortic stenosis. The peak transaortic velocity is 5.37 m/s, peak transaortic gradient is 115.3 mmHg and mean transaortic gradient is 69.0 mmHg with an LVOT/aortic valve VTI ratio of 0.18. The aortic valve area is estimated at 0.68 cm² by the continuity equation. There is mild to moderate aortic regurgitation. No pericardial effusion seen. No prior echocardiogram is available for comparison.     Patient seen and examined at bedside, states she is feeling "a lot better and left groin pain is resolved".   Denies CP, SOB, palpitations or dizziness    No significant events overnight      REVIEW OF SYSTEMS:  Constitutional:     [ ] negative [ ] fevers [ ] chills [ ] weight loss [ ] weight gain  HEENT:                  [ ] negative [ ] dry eyes [ ] eye irritation [ ] postnasal drip [ ] nasal congestion  CV:                         [ ] negative  [ ] chest pain [ ] orthopnea [ ] palpitations [ ] murmur  Resp:                     [ ] negative [ ] cough [ ] shortness of breath [ ] dyspnea [ ] wheezing [ ] sputum [ ]hemoptysis  GI:                          [ ] negative [ ] nausea [ ] vomiting [ ] diarrhea [ ] constipation [ ] abd pain [ ] dysphagia   :                        [ ] negative [ ] dysuria [ ] nocturia [ ] hematuria [ ] increased urinary frequency  Musculoskeletal: [ ] negative [ ] back pain [ ] myalgias [ ] arthralgias [ ] fracture  Skin:                       [ ] negative [ ] rash [ ] itch  Neurological:        [ ] negative [ ] headache [ ] dizziness [ ] syncope [ ] weakness [ ] numbness  Psychiatric:           [ ] negative [ ] anxiety [ ] depression  Endocrine:            [ ] negative [ ] diabetes [ ] thyroid problem  Heme/Lymph:      [ ] negative [ ] anemia [ ] bleeding problem  Allergic/Immune: [ ] negative [ ] itchy eyes [ ] nasal discharge [ ] hives [ ] angioedema    [x] All other systems negative  [ ] Unable to assess ROS due to    Current Meds:  acetaminophen     Tablet .. 975 milliGRAM(s) Oral every 8 hours  chlorhexidine 2% Cloths 1 Application(s) Topical <User Schedule>  dextrose 5%. 1000 milliLiter(s) IV Continuous <Continuous>  dextrose 5%. 1000 milliLiter(s) IV Continuous <Continuous>  dextrose 50% Injectable 12.5 Gram(s) IV Push once  dextrose 50% Injectable 25 Gram(s) IV Push once  dextrose 50% Injectable 25 Gram(s) IV Push once  dextrose Oral Gel 15 Gram(s) Oral once PRN  enoxaparin Injectable 40 milliGRAM(s) SubCutaneous every 24 hours  fentaNYL    Injectable 25 MICROGram(s) IV Push every 5 minutes PRN  glucagon  Injectable 1 milliGRAM(s) IntraMuscular once  HYDROmorphone  Injectable 0.5 milliGRAM(s) IV Push every 10 minutes PRN  influenza  Vaccine (HIGH DOSE) 0.7 milliLiter(s) IntraMuscular once  insulin lispro (ADMELOG) corrective regimen sliding scale   SubCutaneous three times a day before meals  insulin lispro (ADMELOG) corrective regimen sliding scale   SubCutaneous at bedtime  lactated ringers. 1000 milliLiter(s) IV Continuous <Continuous>  ondansetron Injectable 4 milliGRAM(s) IV Push once PRN  oxyCODONE    IR 5 milliGRAM(s) Oral every 4 hours PRN  oxyCODONE    IR 10 milliGRAM(s) Oral every 4 hours PRN  pantoprazole    Tablet 40 milliGRAM(s) Oral before breakfast  polyethylene glycol 3350 17 Gram(s) Oral daily  senna 2 Tablet(s) Oral at bedtime  simvastatin 20 milliGRAM(s) Oral at bedtime      PAST MEDICAL & SURGICAL HISTORY:  DM2 (diabetes mellitus, type 2)      HTN (hypertension)      No significant past surgical history          Vitals:  T(F): 98.4 (), Max: 98.9 ()  HR: 96 () (73 - 106)  BP: 130/51 () (91/36 - 157/61)  RR: 14 ()  SpO2: 98% ()  I&O's Summary    2024 07:  -  2024 07:00  --------------------------------------------------------  IN: 1840 mL / OUT: 1350 mL / NET: 490 mL    2024 07:  -  2024 09:31  --------------------------------------------------------  IN: 200 mL / OUT: 350 mL / NET: -150 mL        Physical Exam:  Appearance: No acute distress; well appearing  Eyes: PERRL, EOMI, pink conjunctiva  HENT: Normal oral mucosa  Cardiovascular: III/IV, blowing holosystolic ejection murmur,   Respiratory: Clear to auscultation bilaterally  Gastrointestinal: soft, non-tender, non-distended with normal bowel sounds  Musculoskeletal: No clubbing; no joint deformity.    Neurologic: Non-focal  Lymphatic: No lymphadenopathy  Psychiatry: AAOx3, mood & affect appropriate  Skin: No rashes, ecchymoses, or cyanosis  Extremities: Left hip dressing dry/intact                          9.1    8.89  )-----------( 201      ( 2024 06:00 )             26.5         137  |  99  |  15  ----------------------------<  180<H>  3.8   |  25  |  0.57    Ca    9.2      2024 06:00  Phos  4.0       Mg     1.40           PT/INR - ( 2024 01:11 )   PT: 12.5 sec;   INR: 1.11 ratio         PTT - ( 2024 01:11 )  PTT:29.5 sec      RADIOLOGIC TESTIN24 Left hip/pelvis X-ray: There is mild impaction and varus angulation of intertrochanteric fracture of the left proximal femur. There are mild degenerative changes of the. There is no focal soft tissue abnormality.  24 Repeat left hip X-ray: Re-demonstration of acute displaced and impacted left proximal intertrochanteric femoral fracture.    DIAGNOSTIC TESTIN/26/24 Echocardiogram (24): Left ventricular cavity is small. Left ventricular wall thickness is mildly increased. Left ventricular systolic function is normal with an ejection fraction of 65% by Madrigal's method of disks. There are no regional wall motion abnormalities seen. Normal right ventricular cavity size, with wall thickness, and normal systolic function. There is severe aortic stenosis. The peak transaortic velocity is 5.37 m/s, peak transaortic gradient is 115.3 mmHg and mean transaortic gradient is 69.0 mmHg with an LVOT/aortic valve VTI ratio of 0.18. The aortic valve area is estimated at 0.68 cm² by the continuity equation. There is mild to moderate aortic regurgitation. No pericardial effusion seen. No prior echocardiogram is available for comparison.

## 2024-02-29 NOTE — PHYSICAL THERAPY INITIAL EVALUATION ADULT - MANUAL MUSCLE TESTING RESULTS, REHAB EVAL
Bilateral upper extremities 5/5, Right lower extremity 3+/5, left hamstring 3-/5, left quadriceps 2-/5, left ankle 3/5

## 2024-02-29 NOTE — PHYSICAL THERAPY INITIAL EVALUATION ADULT - ACTIVE RANGE OF MOTION EXAMINATION, REHAB EVAL
except right knee ~0-50 degrees/bilateral upper extremity Active ROM was WFL (within functional limits)/bilateral  lower extremity Active ROM was WFL (within functional limits)

## 2024-02-29 NOTE — PROGRESS NOTE ADULT - SUBJECTIVE AND OBJECTIVE BOX
Orthopaedic Surgery Progress Note    Subjective:   Patient seen and examined. No acute events overnight. Pain well controlled on current regimen. Feels great and very eager to get out of bed.     Objective:  T(C): 36.9 (02-29-24 @ 06:00), Max: 37.2 (02-28-24 @ 11:04)  HR: 87 (02-29-24 @ 07:00) (73 - 106)  BP: 140/60 (02-29-24 @ 06:00) (91/36 - 157/61)  RR: 16 (02-29-24 @ 07:00) (13 - 29)  SpO2: 98% (02-29-24 @ 07:00) (94% - 100%)  Wt(kg): --    02-28 @ 07:01  -  02-29 @ 07:00  --------------------------------------------------------  IN: 1500 mL / OUT: 1350 mL / NET: 150 mL        PE    NAD  LLE:   dressing C/D/I  motor intact GS/TA/EHL  SILT S/S/SP/DP  WWP                          9.1    8.89  )-----------( 201      ( 29 Feb 2024 06:00 )             26.5         73y Female s/p L hip IMN  - Hgb improved s/p 1U intraop  - Pain control  - WBAT  - PT/OT/OOB  - DVT ppx: lovenox  - Appreciate structural heart and cardiology team involvement for critical aortic stenosis  - Dispo planning pending PT eval

## 2024-02-29 NOTE — OCCUPATIONAL THERAPY INITIAL EVALUATION ADULT - PERTINENT HX OF CURRENT PROBLEM, REHAB EVAL
73 year old female with history of DM and HTN presents to [facility] with c/o left hip pain s/p mechanical fall. Found to have left hip fracture now s/p left hip IMN on 2/28/24.

## 2024-02-29 NOTE — PROGRESS NOTE ADULT - PROBLEM SELECTOR PLAN 1
- Remains symptoms free at rest.  Unable to elicit any exertional symptoms from AS due to reduced exercise tolerance iso severe arthritis of knees.  s/p L hip IMN  - Echo shows severe aortic stenosis (PIPPA 0.68 sqcm, pGr 115.3, mGr 69.0, EF 65%)  - Patient is considered higher than average patient for having adverse cardiovascular events due to fixed obstructive valve condition  - Post operative management should include telemetry vs SICU if necessary  - Pt will need further TAVR workup and possibly elective TAVR/SAVR for aortic stenosis depending on her clinical course and once she has recovered from her hip surgery  - Please call us with any perioperative issues; we will follow the patient in the postop period - Remains symptoms free at rest.  Unable to elicit any exertional symptoms from AS due to reduced exercise tolerance iso severe arthritis of knees.  s/p L hip IMN  - Echo shows severe aortic stenosis (PIPPA 0.68 sqcm, pGr 115.3, mGr 69.0, EF 65%)  - Patient is considered higher than average patient for having adverse cardiovascular events due to fixed obstructive valve condition  - Post operative management should include telemetry vs SICU if necessary  - Pt will need further TAVR workup and possibly elective TAVR/SAVR for aortic stenosis depending on her clinical course and once she has recovered from her hip surgery  - Pt is recommended to follow up outpatient with Dr Alia Crockett in TAVR Clinic at [facility] in 2-3 weeks.  Interventional cardiology will schedule visit and inform patient and family prior to discharge

## 2024-03-01 ENCOUNTER — TRANSCRIPTION ENCOUNTER (OUTPATIENT)
Age: 74
End: 2024-03-01

## 2024-03-01 VITALS
SYSTOLIC BLOOD PRESSURE: 140 MMHG | OXYGEN SATURATION: 99 % | TEMPERATURE: 98 F | RESPIRATION RATE: 18 BRPM | HEART RATE: 76 BPM | DIASTOLIC BLOOD PRESSURE: 58 MMHG

## 2024-03-01 LAB
GLUCOSE BLDC GLUCOMTR-MCNC: 166 MG/DL — HIGH (ref 70–99)
GLUCOSE BLDC GLUCOMTR-MCNC: 169 MG/DL — HIGH (ref 70–99)
GLUCOSE BLDC GLUCOMTR-MCNC: 243 MG/DL — HIGH (ref 70–99)
HCT VFR BLD CALC: 26 % — LOW (ref 34.5–45)
HGB BLD-MCNC: 8.8 G/DL — LOW (ref 11.5–15.5)
MCHC RBC-ENTMCNC: 30.8 PG — SIGNIFICANT CHANGE UP (ref 27–34)
MCHC RBC-ENTMCNC: 33.8 GM/DL — SIGNIFICANT CHANGE UP (ref 32–36)
MCV RBC AUTO: 90.9 FL — SIGNIFICANT CHANGE UP (ref 80–100)
NRBC # BLD: 0 /100 WBCS — SIGNIFICANT CHANGE UP (ref 0–0)
NRBC # FLD: 0 K/UL — SIGNIFICANT CHANGE UP (ref 0–0)
PLATELET # BLD AUTO: 215 K/UL — SIGNIFICANT CHANGE UP (ref 150–400)
RBC # BLD: 2.86 M/UL — LOW (ref 3.8–5.2)
RBC # FLD: 12.7 % — SIGNIFICANT CHANGE UP (ref 10.3–14.5)
WBC # BLD: 8.66 K/UL — SIGNIFICANT CHANGE UP (ref 3.8–10.5)
WBC # FLD AUTO: 8.66 K/UL — SIGNIFICANT CHANGE UP (ref 3.8–10.5)

## 2024-03-01 RX ORDER — ASPIRIN/CALCIUM CARB/MAGNESIUM 324 MG
1 TABLET ORAL
Qty: 84 | Refills: 0 | DISCHARGE
Start: 2024-03-01 | End: 2024-04-11

## 2024-03-01 RX ORDER — OXYCODONE HYDROCHLORIDE 5 MG/1
1 TABLET ORAL
Qty: 28 | Refills: 0
Start: 2024-03-01 | End: 2024-03-07

## 2024-03-01 RX ORDER — METOPROLOL TARTRATE 50 MG
1 TABLET ORAL
Qty: 0 | Refills: 0 | DISCHARGE
Start: 2024-03-01

## 2024-03-01 RX ORDER — METOPROLOL TARTRATE 50 MG
25 TABLET ORAL DAILY
Refills: 0 | Status: DISCONTINUED | OUTPATIENT
Start: 2024-03-01 | End: 2024-03-01

## 2024-03-01 RX ORDER — ASPIRIN/CALCIUM CARB/MAGNESIUM 324 MG
1 TABLET ORAL
Qty: 84 | Refills: 0
Start: 2024-03-01 | End: 2024-04-11

## 2024-03-01 RX ORDER — PANTOPRAZOLE SODIUM 20 MG/1
1 TABLET, DELAYED RELEASE ORAL
Qty: 30 | Refills: 0
Start: 2024-03-01 | End: 2024-03-30

## 2024-03-01 RX ORDER — ACETAMINOPHEN 500 MG
3 TABLET ORAL
Qty: 0 | Refills: 0 | DISCHARGE
Start: 2024-03-01

## 2024-03-01 RX ORDER — POLYETHYLENE GLYCOL 3350 17 G/17G
17 POWDER, FOR SOLUTION ORAL
Qty: 0 | Refills: 0 | DISCHARGE
Start: 2024-03-01

## 2024-03-01 RX ORDER — LISINOPRIL 2.5 MG/1
1 TABLET ORAL
Refills: 0 | DISCHARGE

## 2024-03-01 RX ORDER — SENNA PLUS 8.6 MG/1
2 TABLET ORAL
Qty: 0 | Refills: 0 | DISCHARGE
Start: 2024-03-01

## 2024-03-01 RX ADMIN — Medication 975 MILLIGRAM(S): at 04:54

## 2024-03-01 RX ADMIN — Medication 1: at 12:22

## 2024-03-01 RX ADMIN — ENOXAPARIN SODIUM 40 MILLIGRAM(S): 100 INJECTION SUBCUTANEOUS at 04:54

## 2024-03-01 RX ADMIN — PANTOPRAZOLE SODIUM 40 MILLIGRAM(S): 20 TABLET, DELAYED RELEASE ORAL at 04:54

## 2024-03-01 RX ADMIN — Medication 2: at 08:32

## 2024-03-01 RX ADMIN — Medication 1: at 17:40

## 2024-03-01 RX ADMIN — Medication 975 MILLIGRAM(S): at 14:32

## 2024-03-01 RX ADMIN — Medication 975 MILLIGRAM(S): at 05:24

## 2024-03-01 RX ADMIN — Medication 975 MILLIGRAM(S): at 13:49

## 2024-03-01 RX ADMIN — Medication 25 MILLIGRAM(S): at 12:21

## 2024-03-01 NOTE — PROGRESS NOTE ADULT - ASSESSMENT
A/P  Patient L IT fracture plan for IMN 2/28/24.  PATRICIA. NAD.  PT/OT-NWB LLE  NPO  Appreciate medical optimization  Appreciate cardiology input for follow up for TAVR/BAV post operatively   IS  DVT PPx: held pre operatively  Pain Control  Dispo planning: pending post op PT eval and treatment 
Pre-Operative Cardiac Risk Stratification and Optimization    Based on patient history and physical exam, the patient is considered to have high risk for major CV events   Echo shows critical aortic stenosis  as per structural heart team pt can proceed to planned hip surgery for hip fx  cardiac anesthesia  SICU vs Tele post op pending her clinical status after surgery   use phenylephrine if pt is hypotensive     Aortic stenosis  critical range   BP labile   would hold off to BB for now   pt to follow up soon after surgery with structural heart clinic for TAVR    HLD   on statin      Advanced care planning was discussed with patient and family.  Risks, benefits and alternatives of the cardiac treatments and medical therapy including procedures were discussed in detail and all questions were answered. Importance of compliance with medical therapy and lifestyle modification to improve cardiovascular health were addressed. Appropriate forms and patient educational materials were reviewed. 30 minutes face to face spent.    
74 y/o female with a PMHx of HTN, HLD, DM and OA presented to ED s/p mechanical slip and fall, found to have an acute displaced and impacted left proximal intertrochanteric femoral fracture, s/p L hip IMN, with course complicated by severe/critical aortic stenosis.
A/P: Patient is a 73y y/o Female s/p L hip IMN, POD #0   - Pain control  - Antibiotics - Ancef postop  - DVT ppx  - lovenox  - Incentive spirometry  - Venodynes  - F/U AM Labs  - PT/OT/WBAT  - Notify Orthopedics with any questions  
Aortic stenosis  critical range   BP labile   will consider low dose BB   fu with structural heart team for tavr as outpt     HLD   on statin         
Aortic stenosis  critical range   start toprol xl   fu with structural heart team for tavr as outpt     HLD   on statin     HTN  as above         
Patient is a 73-year-old female with a history of DM 2, HTN, HLD who presents to the ED with left hip/groin pain after mechanical fall, found w displaced L IT fracture, plan for OR for ORIF     1 - Ortho - IT fx was for ORIF - now held  - WB per ortho  # pre-op - TTE w critical AS - plan for cath, TAVR - dw ortho - ongoing d.w card    2- HTN - controlled, hold Ace-i in setting of AS    2- HLD -cw home Zocor    3- DM2 - on metformin at home - hold oral and can start NISS for now -A1C controlled     4- hyponatremia - suspect SIADH in setting of hip, knee pain - if resuming diet, DC IVF and check urine osm, lytes - trend for now - will consider salt tabs pending trend and osm     - DVT ppx - defer to ortho       
72 y/o female with a PMHx of HTN, HLD, DM and OA presented to ED s/p mechanical slip and fall, found to have an acute displaced and impacted left proximal intertrochanteric femoral fracture, s/p L hip IMN, with course complicated by severe/critical aortic stenosis.
74 y/o female with a PMHx of HTN, HLD, DM and OA presented to ED s/p mechanical slip and fall, found to have an acute displaced and impacted left proximal intertrochanteric femoral fracture, with course complicated by severe/critical aortic stenosis.

## 2024-03-01 NOTE — DISCHARGE NOTE PROVIDER - NSDCMRMEDTOKEN_GEN_ALL_CORE_FT
3:1 commode: s/p L IMN  acetaminophen 325 mg oral tablet: 3 tab(s) orally every 8 hours  metFORMIN 1000 mg oral tablet: 1 tab(s) orally 2 times a day  metoprolol succinate 25 mg oral tablet, extended release: 1 tab(s) orally once a day  oxyCODONE 5 mg oral tablet: 1 tab(s) orally every 6 hours as needed for  severe pain MDD: 4  pantoprazole 40 mg oral delayed release tablet: 1 tab(s) orally once a day (before a meal)  polyethylene glycol 3350 oral powder for reconstitution: 17 gram(s) orally once a day  Rolling walker: s/p L IMN  senna leaf extract oral tablet: 2 tab(s) orally once a day (at bedtime)  simvastatin 20 mg oral tablet: 1 tab(s) orally once a day

## 2024-03-01 NOTE — CHART NOTE - NSCHARTNOTEFT_GEN_A_CORE
Patient requires a 3 in 1 commode.  Patient is confined to a single floor in a home and there is no bathroom on that floor.    Thalia Mccrary PA-C  Team Pager #[phone]

## 2024-03-01 NOTE — PROGRESS NOTE ADULT - SUBJECTIVE AND OBJECTIVE BOX
Orthopaedic Surgery Progress Note    Subjective:   Patient seen and examined. No acute events overnight. Pain well controlled on current regimen. Feels great. Ready to go home this afternoon.     Objective:  Vital Signs Last 24 Hrs  T(C): 37.7 (01 Mar 2024 04:48), Max: 37.7 (01 Mar 2024 04:48)  T(F): 99.9 (01 Mar 2024 04:48), Max: 99.9 (01 Mar 2024 04:48)  HR: 88 (01 Mar 2024 04:48) (78 - 102)  BP: 149/66 (01 Mar 2024 04:48) (118/58 - 149/66)  BP(mean): 70 (29 Feb 2024 13:00) (68 - 764)  RR: 18 (01 Mar 2024 04:48) (14 - 23)  SpO2: 99% (01 Mar 2024 04:48) (94% - 100%)    Parameters below as of 01 Mar 2024 04:48  Patient On (Oxygen Delivery Method): room air      PE    NAD  LLE:   dressing C/D/I  motor intact GS/TA/EHL  SILT S/S/SP/DP  WWP                          9.1    8.89  )-----------( 201      ( 29 Feb 2024 06:00 )             26.5         73y Female s/p L hip IMN 2/28  - Hgb improved s/p 1U intraop  - Pain control  - WBAT  - PT/OT/OOB  - DVT ppx: lovenox  - Appreciate structural heart and cardiology team involvement for critical aortic stenosis  - Dispo: home today

## 2024-03-01 NOTE — DISCHARGE NOTE NURSING/CASE MANAGEMENT/SOCIAL WORK - NSDCPNINST_GEN_ALL_CORE
Any temperature greater than 100.4, severe pain not relieved with medications or bleeding from surgical site please call your provider. Continue to ambulate as instructed.

## 2024-03-01 NOTE — PROGRESS NOTE ADULT - PROVIDER SPECIALTY LIST ADULT
Cardiology
Intervent Cardiology
Orthopedics
Anesthesia
Intervent Cardiology
Orthopedics
Orthopedics
Hospitalist
Intervent Cardiology
Orthopedics
Orthopedics

## 2024-03-01 NOTE — DISCHARGE NOTE PROVIDER - NSDCCPTREATMENT_GEN_ALL_CORE_FT
PRINCIPAL PROCEDURE  Procedure: Open reduction and internal fixation of left hip using interlocking intramedullary nail  Findings and Treatment:

## 2024-03-01 NOTE — PROGRESS NOTE ADULT - REASON FOR ADMISSION
L hip fx
Left proximal intertrochanteric femoral fracture
L hip fx

## 2024-03-01 NOTE — DISCHARGE NOTE PROVIDER - HOSPITAL COURSE
This is a 74 yo female with PMH of HTN, DM, HLD who presents to [facility] for orthopedic surgery. Patient s/p L IMN with Dr. Soliz on 2/28. Patient tolerated the procedure well without any intraoperative complications. Patient tolerated physical therapy well, and the pain was controlled. Patient is weight bearing as tolerated with cane/walker as needed. Seen by medical and structural heart attendings for continuity of care, management in house, and cleared for safe discharge. Keep dressing/incision clean, dry and intact. Any suture/staples to be removed on post-op day #14 your office visit. Patient is on 325 mg of aspirin for DVT prophylaxis, please take for 6 weeks unless otherwise instructed by your surgeon. Please follow up with Dr. Soliz in 2 weeks, call the office to make an appointment, [phone]. Please follow up with your PMD for continuity of care and management as medications may have changed.    Patient's preop echocardiogram indicated that she has critical aortic stenosis. The interventional cardiology team (Dr. Alia Crockett) discussed the long term need for an aortic valve replacement and recommended outpatient followup in the [facility] TAVR clinic in 2-3 weeks.

## 2024-03-01 NOTE — PROGRESS NOTE ADULT - PROBLEM SELECTOR PLAN 1
- Remains symptoms free at rest.  Unable to elicit any exertional symptoms from AS due to reduced exercise tolerance iso severe arthritis of knees.  s/p L hip IMN  - Echo shows severe aortic stenosis (PIPPA 0.68 sqcm, pGr 115.3, mGr 69.0, EF 65%)  - Patient is considered higher than average patient for having adverse cardiovascular events due to fixed obstructive valve condition  - Post operative management should include SICU with telemetry  - Pt will need further TAVR workup and possibly elective TAVR/SAVR for aortic stenosis depending on her clinical course and once she has recovered from her hip surgery  - Pt is deemed stable for discharge from cardiology standpoint, recommended to follow up outpatient with Dr Alia Crockett in TAVR Clinic at [facility] in 2-3 weeks.    -Interventional cardiology will schedule visit and inform patient and family prior to discharge

## 2024-03-01 NOTE — PROGRESS NOTE ADULT - SUBJECTIVE AND OBJECTIVE BOX
Patient seen and examined at bedside, found HDS.  Pt denies active CP, SOB, palpitations, diaphoresis, orthopnea, PND, dizziness, syncope, hemoptysis, hematuria, melena, abdominal, pain/discomfort, LE swelling or any other complaints at this time      No acute events overnight    REVIEW OF SYSTEMS:  Constitutional:     [ ] negative [ ] fevers [ ] chills [ ] weight loss [ ] weight gain  HEENT:                  [ ] negative [ ] dry eyes [ ] eye irritation [ ] postnasal drip [ ] nasal congestion  CV:                         [ ] negative  [ ] chest pain [ ] orthopnea [ ] palpitations [ ] murmur  Resp:                     [ ] negative [ ] cough [ ] shortness of breath [ ] dyspnea [ ] wheezing [ ] sputum [ ]hemoptysis  GI:                          [ ] negative [ ] nausea [ ] vomiting [ ] diarrhea [ ] constipation [ ] abd pain [ ] dysphagia   :                        [ ] negative [ ] dysuria [ ] nocturia [ ] hematuria [ ] increased urinary frequency  Musculoskeletal: [ ] negative [ ] back pain [ ] myalgias [ ] arthralgias [ ] fracture  Skin:                       [ ] negative [ ] rash [ ] itch  Neurological:        [ ] negative [ ] headache [ ] dizziness [ ] syncope [ ] weakness [ ] numbness  Psychiatric:           [ ] negative [ ] anxiety [ ] depression  Endocrine:            [ ] negative [ ] diabetes [ ] thyroid problem  Heme/Lymph:      [ ] negative [ ] anemia [ ] bleeding problem  Allergic/Immune: [ ] negative [ ] itchy eyes [ ] nasal discharge [ ] hives [ ] angioedema    [x ] All other systems negative  [ ] Unable to assess ROS due to    Current Meds:  acetaminophen     Tablet .. 975 milliGRAM(s) Oral every 8 hours  dextrose 5%. 1000 milliLiter(s) IV Continuous <Continuous>  dextrose 5%. 1000 milliLiter(s) IV Continuous <Continuous>  dextrose 50% Injectable 25 Gram(s) IV Push once  dextrose 50% Injectable 25 Gram(s) IV Push once  dextrose 50% Injectable 12.5 Gram(s) IV Push once  dextrose Oral Gel 15 Gram(s) Oral once PRN  enoxaparin Injectable 40 milliGRAM(s) SubCutaneous every 24 hours  glucagon  Injectable 1 milliGRAM(s) IntraMuscular once  HYDROmorphone  Injectable 0.5 milliGRAM(s) IV Push every 10 minutes PRN  influenza  Vaccine (HIGH DOSE) 0.7 milliLiter(s) IntraMuscular once  insulin lispro (ADMELOG) corrective regimen sliding scale   SubCutaneous three times a day before meals  insulin lispro (ADMELOG) corrective regimen sliding scale   SubCutaneous at bedtime  lactated ringers. 1000 milliLiter(s) IV Continuous <Continuous>  metoprolol succinate ER 25 milliGRAM(s) Oral daily  oxyCODONE    IR 10 milliGRAM(s) Oral every 4 hours PRN  oxyCODONE    IR 5 milliGRAM(s) Oral every 4 hours PRN  pantoprazole    Tablet 40 milliGRAM(s) Oral before breakfast  polyethylene glycol 3350 17 Gram(s) Oral daily  senna 2 Tablet(s) Oral at bedtime  simvastatin 20 milliGRAM(s) Oral at bedtime      PAST MEDICAL & SURGICAL HISTORY:  DM2 (diabetes mellitus, type 2)      HTN (hypertension)      No significant past surgical history          Vitals:  T(F): 98.1 (), Max: 99.9 ()  HR: 75 () (75 - 101)  BP: 144/55 () (122/78 - 149/66)  RR: 18 ()  SpO2: 99% ()  I&O's Summary    2024 07:01  -  01 Mar 2024 07:00  --------------------------------------------------------  IN: 1560 mL / OUT: 1700 mL / NET: -140 mL      Physical Exam:  Appearance: No acute distress; well appearing  Neck: Supple, no JVD B/L, no Carotid Bruit B/L  Cardiovascular: RRR, S1, S2, no murmurs, rubs, or gallops, no edema  Respiratory: Clear to auscultation bilaterally, no RRW B/L  Gastrointestinal: soft, non-tender, non-distended with normal bowel sounds  Neurologic: No focal or neuro deficits noted  Psychiatry: AAOx3, mood & affect appropriate  Extremities:  Left Hip dressing, C/D/I                            8.8    8.66  )-----------( 215      ( 01 Mar 2024 07:29 )             26.0         137  |  99  |  15  ----------------------------<  180<H>  3.8   |  25  |  0.57    Ca    9.2      2024 06:00  Phos  4.0       Mg     1.40             RADIOLOGIC TESTIN24 Left hip/pelvis X-ray: There is mild impaction and varus angulation of intertrochanteric fracture of the left proximal femur. There are mild degenerative changes of the. There is no focal soft tissue abnormality.  24 Repeat left hip X-ray: Re-demonstration of acute displaced and impacted left proximal intertrochanteric femoral fracture.    DIAGNOSTIC TESTIN/26/24 Echocardiogram (24): Left ventricular cavity is small. Left ventricular wall thickness is mildly increased. Left ventricular systolic function is normal with an ejection fraction of 65% by Madrigal's method of disks. There are no regional wall motion abnormalities seen. Normal right ventricular cavity size, with wall thickness, and normal systolic function. There is severe aortic stenosis. The peak transaortic velocity is 5.37 m/s, peak transaortic gradient is 115.3 mmHg and mean transaortic gradient is 69.0 mmHg with an LVOT/aortic valve VTI ratio of 0.18. The aortic valve area is estimated at 0.68 cm² by the continuity equation. There is mild to moderate aortic regurgitation. No pericardial effusion seen. No prior echocardiogram is available for comparison.

## 2024-03-01 NOTE — DISCHARGE NOTE PROVIDER - CARE PROVIDER_API CALL
Timothy Soliz  Orthopaedic Surgery  [address], [address]  [place] [address]  Phone: [phone]  Fax: [phone]  Follow Up Time:     Alia Crockett)  Interventional Cardiology  [address] [place] [address], [address]  [place] [address]  Phone: [phone]  Fax: [phone]  Follow Up Time:

## 2024-03-01 NOTE — DISCHARGE NOTE PROVIDER - CARE PROVIDERS DIRECT ADDRESSES
[email or web address]

## 2024-03-01 NOTE — DISCHARGE NOTE NURSING/CASE MANAGEMENT/SOCIAL WORK - NSSCNAMETXT_GEN_ALL_CORE
[facility] at [place] [phone] initial visit will be day after discharge home. A nurse will call prior to the home visit.

## 2024-03-01 NOTE — DISCHARGE NOTE NURSING/CASE MANAGEMENT/SOCIAL WORK - PATIENT PORTAL LINK FT
You can access the FollowMyHealth Patient Portal offered by [facility] by registering at the following website: [email or web address]. By joining [employer]’s FollowMyHealth portal, you will also be able to view your health information using other applications (apps) compatible with our system.

## 2024-03-01 NOTE — PROGRESS NOTE ADULT - SUBJECTIVE AND OBJECTIVE BOX
Subjective: Patient seen and examined. No new events except as noted.     SUBJECTIVE/ROS:  No chest pain, dyspnea, palpitation, or dizziness.       MEDICATIONS:  MEDICATIONS  (STANDING):  acetaminophen     Tablet .. 975 milliGRAM(s) Oral every 8 hours  chlorhexidine 2% Cloths 1 Application(s) Topical <User Schedule>  dextrose 5%. 1000 milliLiter(s) (100 mL/Hr) IV Continuous <Continuous>  dextrose 5%. 1000 milliLiter(s) (50 mL/Hr) IV Continuous <Continuous>  dextrose 50% Injectable 12.5 Gram(s) IV Push once  dextrose 50% Injectable 25 Gram(s) IV Push once  dextrose 50% Injectable 25 Gram(s) IV Push once  enoxaparin Injectable 40 milliGRAM(s) SubCutaneous every 24 hours  glucagon  Injectable 1 milliGRAM(s) IntraMuscular once  influenza  Vaccine (HIGH DOSE) 0.7 milliLiter(s) IntraMuscular once  insulin lispro (ADMELOG) corrective regimen sliding scale   SubCutaneous at bedtime  insulin lispro (ADMELOG) corrective regimen sliding scale   SubCutaneous three times a day before meals  lactated ringers. 1000 milliLiter(s) (100 mL/Hr) IV Continuous <Continuous>  pantoprazole    Tablet 40 milliGRAM(s) Oral before breakfast  polyethylene glycol 3350 17 Gram(s) Oral daily  senna 2 Tablet(s) Oral at bedtime  simvastatin 20 milliGRAM(s) Oral at bedtime      PHYSICAL EXAM:  T(C): 37.7 (03-01-24 @ 04:48), Max: 37.7 (03-01-24 @ 04:48)  HR: 88 (03-01-24 @ 04:48) (78 - 102)  BP: 149/66 (03-01-24 @ 04:48) (122/78 - 149/66)  RR: 18 (03-01-24 @ 04:48) (14 - 23)  SpO2: 99% (03-01-24 @ 04:48) (94% - 100%)  Wt(kg): --  I&O's Summary    29 Feb 2024 07:01  -  01 Mar 2024 07:00  --------------------------------------------------------  IN: 1560 mL / OUT: 1700 mL / NET: -140 mL            JVP: Normal  Neck: supple  Lung: clear   CV: S1 S2 , Murmur:  Abd: soft  Ext: No edema  neuro: Awake / alert  Psych: flat affect  Skin: normal``    LABS/DATA:    CARDIAC MARKERS:                                9.1    8.89  )-----------( 201      ( 29 Feb 2024 06:00 )             26.5     02-29    137  |  99  |  15  ----------------------------<  180<H>  3.8   |  25  |  0.57    Ca    9.2      29 Feb 2024 06:00  Phos  4.0     02-28  Mg     1.40     02-28      proBNP:   Lipid Profile:   HgA1c:   TSH:     TELE:  EKG:

## 2024-03-04 PROBLEM — E11.9 TYPE 2 DIABETES MELLITUS WITHOUT COMPLICATIONS: Chronic | Status: ACTIVE | Noted: 2024-02-26

## 2024-03-14 ENCOUNTER — APPOINTMENT (OUTPATIENT)
Dept: ORTHOPEDIC SURGERY | Facility: CLINIC | Age: 74
End: 2024-03-14
Payer: MEDICARE

## 2024-03-14 VITALS — WEIGHT: 138 LBS | BODY MASS INDEX: 25.4 KG/M2 | HEIGHT: 62 IN

## 2024-03-14 PROCEDURE — 99024 POSTOP FOLLOW-UP VISIT: CPT

## 2024-03-14 PROCEDURE — 73502 X-RAY EXAM HIP UNI 2-3 VIEWS: CPT | Mod: LT

## 2024-03-28 DIAGNOSIS — S72.002A FRACTURE OF UNSPECIFIED PART OF NECK OF LEFT FEMUR, INITIAL ENCOUNTER FOR CLOSED FRACTURE: ICD-10-CM

## 2024-03-28 DIAGNOSIS — E11.9 TYPE 2 DIABETES MELLITUS W/OUT COMPLICATIONS: ICD-10-CM
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
## 2024-03-28 RX ORDER — METFORMIN HYDROCHLORIDE 1000 MG/1
1000 TABLET, COATED ORAL
Refills: 0 | Status: ACTIVE | COMMUNITY

## 2024-03-28 RX ORDER — SIMVASTATIN 20 MG/1
20 TABLET, FILM COATED ORAL
Qty: 90 | Refills: 3 | Status: ACTIVE | COMMUNITY

## 2024-03-28 NOTE — ADDENDUM
[FreeTextEntry1] : This note was written by Sujatha Villar on 03/14/2024 acting solely as a scribe for Dr. Timothy Soliz.  All medical record entries made by the Scribe were at my, Dr. Timothy Soliz, direction and personally dictated by me on 03/14/2024. I have personally reviewed the chart and agree that the record accurately reflects my personal performance of the history, physical exam, assessment and plan.

## 2024-03-28 NOTE — HISTORY OF PRESENT ILLNESS
[Healed] : healed [Clean/Dry/Intact] : clean, dry and intact [Neuro Intact] : an unremarkable neurological exam [Vascular Intact] : ~T peripheral vascular exam normal [Excellent Pain Control] : has excellent pain control [Doing Well] : is doing well [No Sign of Infection] : is showing no signs of infection [Steri-Strips Removed & Replaced] : steri-strips removed and replaced [Staples Removed] : staples were removed [Chills] : no chills [Fever] : no fever [Nausea] : no nausea [Erythema] : not erythematous [Vomiting] : no vomiting [Discharge] : absent of discharge [Swelling] : not swollen [Dehiscence] : not dehisced [de-identified] : 74 y/o female s/p left hip IMN 2/28/24 [de-identified] : 72 y/o female s/p left hip IMN. She is able to walk with assistive device. Takes Tylenol for pain. Denies post op complications. Doing well. no cardiac issues.  [de-identified] : Left Hip Exam:   Skin: Clean, dry, intact Inspection: No obvious deformity, no swelling. Pulses: 2+ DP/PT pulses ROM: 0-90 flexion Tenderness: Min throughout.  Strength: not tested Neuro: Sensation intact to light touch throughout,  [de-identified] : The following radiographs were ordered and read by me during this patients visit. I reviewed each radiograph in detail with the patient and discussed the findings as highlighted below.   3 views of the left hip were obtained today, 03/14/2024, that show anatomic IMN, no hardware issues. [de-identified] : 74 y/o female s/p left hip IMN.   All findings were discussed in detail with the patient. All questions were answered and rehabilitation protocol was reviewed in detail. Doign extremely well.   Recommendations:  1. PT evaluation and treatment. Progress protocol as provided. WBAT 2. Meds: NSAIDS/tylenol as tolerated. 3. Follow up with Cardiology for severe AS  Followup in 4 weeks.

## 2024-04-02 ENCOUNTER — APPOINTMENT (OUTPATIENT)
Dept: CARDIOLOGY | Facility: CLINIC | Age: 74
End: 2024-04-02
Payer: MEDICARE

## 2024-04-02 ENCOUNTER — NON-APPOINTMENT (OUTPATIENT)
Age: 74
End: 2024-04-02

## 2024-04-02 VITALS
DIASTOLIC BLOOD PRESSURE: 77 MMHG | BODY MASS INDEX: 25.4 KG/M2 | HEIGHT: 62 IN | HEART RATE: 73 BPM | WEIGHT: 138 LBS | SYSTOLIC BLOOD PRESSURE: 187 MMHG | OXYGEN SATURATION: 97 %

## 2024-04-02 DIAGNOSIS — I35.0 NONRHEUMATIC AORTIC (VALVE) STENOSIS: ICD-10-CM

## 2024-04-02 DIAGNOSIS — I10 ESSENTIAL (PRIMARY) HYPERTENSION: ICD-10-CM

## 2024-04-02 PROCEDURE — 99214 OFFICE O/P EST MOD 30 MIN: CPT

## 2024-04-02 PROCEDURE — 93000 ELECTROCARDIOGRAM COMPLETE: CPT

## 2024-04-05 ENCOUNTER — RX CHANGE (OUTPATIENT)
Age: 74
End: 2024-04-05

## 2024-04-05 RX ORDER — AMLODIPINE BESYLATE 5 MG/1
5 TABLET ORAL
Qty: 90 | Refills: 2 | Status: ACTIVE | COMMUNITY
Start: 1900-01-01 | End: 1900-01-01

## 2024-04-05 RX ORDER — AMLODIPINE BESYLATE 5 MG/1
5 TABLET ORAL
Qty: 30 | Refills: 2 | Status: DISCONTINUED | COMMUNITY
Start: 2024-04-02 | End: 2024-04-05

## 2024-04-15 PROBLEM — I10 HYPERTENSION: Status: ACTIVE | Noted: 2024-03-28

## 2024-04-15 PROBLEM — I35.0 AORTIC STENOSIS: Status: ACTIVE | Noted: 2024-04-15

## 2024-04-15 NOTE — PHYSICAL EXAM
[Well Developed] : well developed [Normal Conjunctiva] : normal conjunctiva [Normal Venous Pressure] : normal venous pressure [de-identified] : 1 out of 6 late peaking ejection systolic murmur at the aortic area

## 2024-04-15 NOTE — ASSESSMENT
[FreeTextEntry1] : 1. Severe aortic stenosis :  Her echocardiogram information is as listed.  Patient has critical aortic stenosis.  She has significantly limited her excessive walking because of arthritis with on detailed discussion with the patient and family she may have reduction in her exercise tolerance over the last 6 to 12 months.  I had a detailed discussion with the patient and recommended that because of the severity of her aortic stenosis and the subtle symptoms that she has which may be masked by her ambulation because of arthritis, she should consider aortic valve replacement.  We discussed options for TAVR versus surgical AVR.  Patient has a personal preference to consider transcatheter options because of lesser invasive nature of the procedure,  TAVR procedure was explained to them, including the potential risk not just limited to vascular injury, needs for vascular intervention, transfusion, MI, CVA, need for PPM, death.  Patient and family verbalized the understanding of above information and would like to pursue further w.u with CT, Cath. Patient also understand if she has bicuspid aortic valve with heavy calcification and aortic dilatation that surgical options will be preferred.  2. Hypertension:  Patient's blood pressure is significantly elevated but she thinks that this is related to her anxiety.  I reassured her but at the same time I feel that blood pressure is significantly elevated and requires treatment with pharmacotherapy I am going to start amlodipine and reassess the blood pressure in a few weeks.

## 2024-04-15 NOTE — REVIEW OF SYSTEMS
[Fever] : no fever [Feeling Fatigued] : feeling fatigued [Blurry Vision] : no blurred vision [Earache] : no earache [SOB] : no shortness of breath [Dyspnea on exertion] : dyspnea during exertion [Cough] : no cough [Abdominal Pain] : no abdominal pain

## 2024-04-15 NOTE — CARDIOLOGY SUMMARY
[de-identified] : Normal sinus rhythm [de-identified] :  1. Left ventricular cavity is small. Left ventricular wall thickness is mildly increased. Left ventricular systolic function is normal with an ejection fraction of 65 % by Madrigal's method of disks. There are no regional wall motion abnormalities seen.  2. Normal right ventricular cavity size, with wall thickness, and normal systolic function.  3. There is severe aortic stenosis. The peak transaortic velocity is 5.37 m/s, peak transaortic gradient is 115.3 mmHg and mean transaortic gradient is 69.0 mmHg with an LVOT/aortic valve VTI ratio of 0.18. The aortic valve area is estimated at 0.68 cm by the continuity equation. There is mild to moderate aortic regurgitation.   4. No pericardial effusion seen.  5. No prior echocardiogram is available for compari

## 2024-04-15 NOTE — HISTORY OF PRESENT ILLNESS
[FreeTextEntry1] : 73-year-old female she was recently admitted to [facility] after she had a mechanical fall leading to hip fracture during this admission she was diagnosed with severe aortic stenosis.  As per the patient she was told years ago that she has aortic stenosis but it was.  Her postop course was uneventful and she was discharged home and is making good progress with her walking.  As per the patient over the last months to years she has limited her walking primarily because of arthritis in the knees.  She is not entirely certain if she gets more tired and fatigued and shortness of breath symptoms because of any of her cardiac related limitations.

## 2024-04-16 ENCOUNTER — APPOINTMENT (OUTPATIENT)
Dept: ORTHOPEDIC SURGERY | Facility: CLINIC | Age: 74
End: 2024-04-16
Payer: MEDICARE

## 2024-04-16 DIAGNOSIS — S72.142D DISPLACED INTERTROCHANTERIC FRACTURE OF LEFT FEMUR, SUBSEQUENT ENCOUNTER FOR CLOSED FRACTURE WITH ROUTINE HEALING: ICD-10-CM

## 2024-04-16 PROCEDURE — 73502 X-RAY EXAM HIP UNI 2-3 VIEWS: CPT | Mod: LT

## 2024-04-16 PROCEDURE — 99024 POSTOP FOLLOW-UP VISIT: CPT

## 2024-04-17 PROBLEM — S72.142D CLOSED DISPLACED INTERTROCHANTERIC FRACTURE OF LEFT FEMUR WITH ROUTINE HEALING, SUBSEQUENT ENCOUNTER: Status: ACTIVE | Noted: 2024-03-28

## 2024-04-17 NOTE — HISTORY OF PRESENT ILLNESS
[Clean/Dry/Intact] : clean, dry and intact [Healed] : healed [Neuro Intact] : an unremarkable neurological exam [Vascular Intact] : ~T peripheral vascular exam normal [Doing Well] : is doing well [Excellent Pain Control] : has excellent pain control [No Sign of Infection] : is showing no signs of infection [Staples Removed] : staples were removed [Chills] : no chills [Fever] : no fever [Nausea] : no nausea [Vomiting] : no vomiting [Erythema] : not erythematous [Discharge] : absent of discharge [Swelling] : not swollen [Dehiscence] : not dehisced [de-identified] : 72 y/o female s/p left hip IMN 2/28/24 [de-identified] : 74 y/o female s/p left hip IMN. She is able to walk with walker. She completed at home PT last week. Takes Tylenol for pain. Denies post op complications. Doing well. [de-identified] : Left Hip Exam:   Skin: Clean, dry, intact Inspection: No obvious deformity, no swelling. Pulses: 2+ DP/PT pulses ROM: 0-90 flexion Tenderness: Min throughout.  Strength: not tested Neuro: Sensation intact to light touch throughout,  [de-identified] : The following radiographs were ordered and read by me during this patients visit. I reviewed each radiograph in detail with the patient and discussed the findings as highlighted below.   3 views of the left hip were obtained today, 04/12/2024, that show anatomic IMN, no hardware issues. [de-identified] : 72 y/o female s/p left hip IMN.   A discussion was had regarding current improvements with physical therapy and postoperative program.  Second phase of recovery program was discussed in detail.  Patient is doing well at this time without any perioperative concerns.   Recommendations:  1.  Continue PT as per protocol.  2.  Transition to cane. Rx given.  3. Meds: Symptomatic NSAIDS/tylenol as needed 4. Ice as needed 5. Restrictions: As discussed  Followup in 6 weeks

## 2024-04-17 NOTE — ADDENDUM
[FreeTextEntry1] : This note was written by Sujatha Villar on 04/16/2024 acting solely as a scribe for Dr. Timothy Soliz.  All medical record entries made by the Scribe were at my, Dr. Timothy Soliz, direction and personally dictated by me on 04/16/2024. I have personally reviewed the chart and agree that the record accurately reflects my personal performance of the history, physical exam, assessment and plan.

## 2024-04-23 ENCOUNTER — OUTPATIENT (OUTPATIENT)
Dept: OUTPATIENT SERVICES | Facility: HOSPITAL | Age: 74
LOS: 1 days | End: 2024-04-23
Payer: MEDICARE

## 2024-04-23 ENCOUNTER — APPOINTMENT (OUTPATIENT)
Dept: CARDIOLOGY | Facility: CLINIC | Age: 74
End: 2024-04-23

## 2024-04-23 ENCOUNTER — APPOINTMENT (OUTPATIENT)
Dept: CARDIOTHORACIC SURGERY | Facility: CLINIC | Age: 74
End: 2024-04-23
Payer: MEDICARE

## 2024-04-23 VITALS
WEIGHT: 138 LBS | HEIGHT: 62 IN | HEART RATE: 79 BPM | OXYGEN SATURATION: 98 % | DIASTOLIC BLOOD PRESSURE: 76 MMHG | BODY MASS INDEX: 25.4 KG/M2 | SYSTOLIC BLOOD PRESSURE: 166 MMHG | RESPIRATION RATE: 15 BRPM

## 2024-04-23 DIAGNOSIS — I35.0 NONRHEUMATIC AORTIC (VALVE) STENOSIS: ICD-10-CM

## 2024-04-23 DIAGNOSIS — R06.00 DYSPNEA, UNSPECIFIED: ICD-10-CM

## 2024-04-23 DIAGNOSIS — Z00.00 ENCOUNTER FOR GENERAL ADULT MEDICAL EXAMINATION WITHOUT ABNORMAL FINDINGS: ICD-10-CM

## 2024-04-23 DIAGNOSIS — F41.9 ANXIETY DISORDER, UNSPECIFIED: ICD-10-CM

## 2024-04-23 DIAGNOSIS — Z87.81 PERSONAL HISTORY OF (HEALED) TRAUMATIC FRACTURE: ICD-10-CM

## 2024-04-23 DIAGNOSIS — R53.83 OTHER FATIGUE: ICD-10-CM

## 2024-04-23 PROCEDURE — 74174 CTA ABD&PLVS W/CONTRAST: CPT

## 2024-04-23 PROCEDURE — 75574 CT ANGIO HRT W/3D IMAGE: CPT

## 2024-04-23 PROCEDURE — 71275 CT ANGIOGRAPHY CHEST: CPT | Mod: 26,MH

## 2024-04-23 PROCEDURE — 71275 CT ANGIOGRAPHY CHEST: CPT

## 2024-04-23 PROCEDURE — 75574 CT ANGIO HRT W/3D IMAGE: CPT | Mod: 26,MH

## 2024-04-23 PROCEDURE — 99205 OFFICE O/P NEW HI 60 MIN: CPT

## 2024-04-23 PROCEDURE — 74174 CTA ABD&PLVS W/CONTRAST: CPT | Mod: 26,MH

## 2024-04-23 RX ORDER — SENNOSIDES 8.6 MG TABLETS 8.6 MG/1
TABLET ORAL DAILY
Refills: 0 | Status: COMPLETED | COMMUNITY
End: 2024-04-23

## 2024-04-23 RX ORDER — PANTOPRAZOLE 40 MG/1
40 TABLET, DELAYED RELEASE ORAL DAILY
Qty: 90 | Refills: 3 | Status: COMPLETED | COMMUNITY
End: 2024-04-23

## 2024-04-23 RX ORDER — POLYETHYLENE GLYCOL 3350 17 G/17G
17 POWDER, FOR SOLUTION ORAL DAILY
Refills: 0 | Status: COMPLETED | COMMUNITY
End: 2024-04-23

## 2024-04-23 RX ORDER — OXYCODONE 5 MG/1
5 TABLET ORAL EVERY 6 HOURS
Refills: 0 | Status: COMPLETED | COMMUNITY
End: 2024-04-23

## 2024-04-23 NOTE — DATA REVIEWED
[FreeTextEntry1] : TTE showed severe aortic stenosis with a valve area of 0.68cm^2, peak and mean gradients of 115 and 69mmHg a peak velocity of 5.4m/sec and a DVI of 0.18 with a normal EF on 2/26/2024.

## 2024-04-23 NOTE — HISTORY OF PRESENT ILLNESS
[Diabetes Mellitus] : Diabetes Mellitus [FreeTextEntry1] : Ms. Vilchis is a 73 year old female referred by Dr. Alia Crockett for evaluation of her aortic stenosis. She has a medical history significant for HTN and DMT2. She was recently admitted at [facility] with a left hip fracture requiring intra medullary nailing.   During that admission she was found to have severe aortic stenosis with a valve area of 0.68cm^2, peak and mean gradients of 115 and 69mmHg a peak velocity of 5.4m/sec and a DVI of 0.18 with a normal EF on 2/26/2024.  She is also scheduled for a cardiac CTA today as part of her TAVR testing. [Dialysis] : no dialysis [Infectious Endocarditis] : no infectious endocarditis [Home Oxygen] : no home oxygen use [Inhaled Medication Therapy] : no inhaled medication therapy [Sleep Apnea] : no sleep apnea [Immunocompromise Present] : not immunocompromised [Unresponsive Neurologic State] : not in a unresponsive neurologic state [Syncope] : no syncope [Cerebrovascular Disease] : no cerebrovascular disease [A fib / A flutter] : no A fib or A flutter

## 2024-04-23 NOTE — REVIEW OF SYSTEMS
[Feeling Tired] : feeling tired [SOB on Exertion] : shortness of breath during exertion [Joint Stiffness] : joint stiffness [Limb Pain] : limb pain [Anxiety] : anxiety [Nosebleeds] : no nosebleeds [Chest Pain] : no chest pain [Palpitations] : no palpitations [Constipation] : no constipation [Pelvic Pain] : no pelvic pain [Itching] : no itching [Dizziness] : no dizziness [Easy Bleeding] : no tendency for easy bleeding

## 2024-04-23 NOTE — END OF VISIT
[FreeTextEntry3] : I, Dr. Neil Mike, personally performed the evaluation and management (E/M) services for this new patient.  That E/M includes conducting the initial examination, assessing all conditions, and establishing the plan of care.  Today, Moni José NP was here to observe my evaluation and management services for this patient to be followed going forward.

## 2024-04-23 NOTE — ASSESSMENT
[FreeTextEntry1] : Ms. Vilchis is a 74y/o female referred by Dr. Alia Crockett for evaluation of her aortic stenosis. She has a medical history significant for HTN and DMT2. She was recently admitted at [facility] with a left hip fracture requiring intra medullary nailing.    During that admission she was found to have severe aortic stenosis with a valve area of 0.68cm^2, peak and mean gradients of 115 and 69mmHg a peak velocity of 5.4m/sec and a DVI of 0.18 with a normal EF on 2/26/2024.  She denies chest pain, shortness of breath, dyspnea on exertion, palpitations, orthopnea, paroxysmal nocturnal dyspnea, claudication, pre syncopal, or syncopal symptoms She is very limited in her activity because of her knees and her hips.     I had the pleasure of evaluating your patient, Ms. LEONID VILCHIS who is a 73-year-old female with heart failure symptoms of fatigue (NYHA class).   Ms. LEONID VILCHIS has severe AS with pG 106 mmHg and mG 69 mmHg,  AoV 5.2 m/sec and an PIPPA of  0.68 cm2).  She is a low risk for surgical aortic valve replacement.    The risks and benefits of both SAVR and HENNY have been explained and the patient would like to proceed with further workup and consideration for HENNY by the structural heart team.  I explained the risks of vascular complication, 5-7 % chance of requiring a pacemaker post procedure and possible paravalvular leakage.   She will require CT scan and catheterization before finalizing plans for the procedure. The patient was also made aware of the possibility of using conscious sedation or general anesthesia during the procedure.    Once completed, all imaging will be reviewed by the Heart Team and an optimal treatment strategy will be recommended.  Plan: 1) Lab work scheduled for today CBC, CMP and Pro BNP 2) Structural CT scan (Today)  3) Cardiac Catherization to evaluate coronary anatomy

## 2024-04-23 NOTE — REASON FOR VISIT
[Consultation] : a consultation visit [Initial Evaluation] : an initial evaluation [Family Member] : family member [FreeTextEntry1] : aortic valve stenosis

## 2024-04-23 NOTE — PHYSICAL EXAM
[General Appearance - Alert] : alert [Jugular Venous Distention Increased] : there was no jugular-venous distention [Respiration, Rhythm And Depth] : normal respiratory rhythm and effort [Auscultation Breath Sounds / Voice Sounds] : lungs were clear to auscultation bilaterally [II] : a grade 2 [Examination Of The Chest] : the chest was normal in appearance [Bowel Sounds] : normal bowel sounds [Abdomen Soft] : soft [Involuntary Movements] : no involuntary movements were seen [Skin Color & Pigmentation] : normal skin color and pigmentation [Oriented To Time, Place, And Person] : oriented to person, place, and time [Right Carotid Bruit] : no bruit heard over the right carotid [Left Carotid Bruit] : no bruit heard over the left carotid

## 2024-05-01 LAB
ANION GAP SERPL CALC-SCNC: 17 MMOL/L
BUN SERPL-MCNC: 13 MG/DL
CALCIUM SERPL-MCNC: 10.3 MG/DL
CHLORIDE SERPL-SCNC: 99 MMOL/L
CO2 SERPL-SCNC: 23 MMOL/L
CREAT SERPL-MCNC: 0.62 MG/DL
EGFR: 93 ML/MIN/1.73M2
GLUCOSE SERPL-MCNC: 87 MG/DL
POTASSIUM SERPL-SCNC: 4.6 MMOL/L
SODIUM SERPL-SCNC: 139 MMOL/L

## 2024-05-09 ENCOUNTER — OUTPATIENT (OUTPATIENT)
Dept: OUTPATIENT SERVICES | Facility: HOSPITAL | Age: 74
LOS: 1 days | End: 2024-05-09
Payer: MEDICARE

## 2024-05-09 VITALS
RESPIRATION RATE: 18 BRPM | TEMPERATURE: 98 F | HEART RATE: 80 BPM | SYSTOLIC BLOOD PRESSURE: 150 MMHG | HEIGHT: 62 IN | WEIGHT: 139.99 LBS | DIASTOLIC BLOOD PRESSURE: 80 MMHG | OXYGEN SATURATION: 96 %

## 2024-05-09 DIAGNOSIS — E11.9 TYPE 2 DIABETES MELLITUS WITHOUT COMPLICATIONS: ICD-10-CM

## 2024-05-09 DIAGNOSIS — I35.0 NONRHEUMATIC AORTIC (VALVE) STENOSIS: ICD-10-CM

## 2024-05-09 DIAGNOSIS — Z29.9 ENCOUNTER FOR PROPHYLACTIC MEASURES, UNSPECIFIED: ICD-10-CM

## 2024-05-09 DIAGNOSIS — I10 ESSENTIAL (PRIMARY) HYPERTENSION: ICD-10-CM

## 2024-05-09 DIAGNOSIS — Z98.890 OTHER SPECIFIED POSTPROCEDURAL STATES: Chronic | ICD-10-CM

## 2024-05-09 DIAGNOSIS — Z87.81 PERSONAL HISTORY OF (HEALED) TRAUMATIC FRACTURE: Chronic | ICD-10-CM

## 2024-05-09 LAB
A1C WITH ESTIMATED AVERAGE GLUCOSE RESULT: 6.1 % — HIGH (ref 4–5.6)
ALBUMIN SERPL ELPH-MCNC: 4.6 G/DL — SIGNIFICANT CHANGE UP (ref 3.3–5)
ALP SERPL-CCNC: 102 U/L — SIGNIFICANT CHANGE UP (ref 40–120)
ALT FLD-CCNC: 14 U/L — SIGNIFICANT CHANGE UP (ref 10–45)
ANION GAP SERPL CALC-SCNC: 13 MMOL/L — SIGNIFICANT CHANGE UP (ref 5–17)
AST SERPL-CCNC: 16 U/L — SIGNIFICANT CHANGE UP (ref 10–40)
BILIRUB SERPL-MCNC: 0.2 MG/DL — SIGNIFICANT CHANGE UP (ref 0.2–1.2)
BLD GP AB SCN SERPL QL: NEGATIVE — SIGNIFICANT CHANGE UP
BUN SERPL-MCNC: 11 MG/DL — SIGNIFICANT CHANGE UP (ref 7–23)
CALCIUM SERPL-MCNC: 10.5 MG/DL — SIGNIFICANT CHANGE UP (ref 8.4–10.5)
CHLORIDE SERPL-SCNC: 99 MMOL/L — SIGNIFICANT CHANGE UP (ref 96–108)
CO2 SERPL-SCNC: 26 MMOL/L — SIGNIFICANT CHANGE UP (ref 22–31)
CREAT SERPL-MCNC: 0.58 MG/DL — SIGNIFICANT CHANGE UP (ref 0.5–1.3)
EGFR: 95 ML/MIN/1.73M2 — SIGNIFICANT CHANGE UP
ESTIMATED AVERAGE GLUCOSE: 128 MG/DL — HIGH (ref 68–114)
GLUCOSE SERPL-MCNC: 109 MG/DL — HIGH (ref 70–99)
HCT VFR BLD CALC: 38.3 % — SIGNIFICANT CHANGE UP (ref 34.5–45)
HGB BLD-MCNC: 12.8 G/DL — SIGNIFICANT CHANGE UP (ref 11.5–15.5)
MCHC RBC-ENTMCNC: 31.1 PG — SIGNIFICANT CHANGE UP (ref 27–34)
MCHC RBC-ENTMCNC: 33.4 GM/DL — SIGNIFICANT CHANGE UP (ref 32–36)
MCV RBC AUTO: 93 FL — SIGNIFICANT CHANGE UP (ref 80–100)
NRBC # BLD: 0 /100 WBCS — SIGNIFICANT CHANGE UP (ref 0–0)
NT-PROBNP SERPL-SCNC: 279 PG/ML — SIGNIFICANT CHANGE UP (ref 0–300)
PLATELET # BLD AUTO: 240 K/UL — SIGNIFICANT CHANGE UP (ref 150–400)
POTASSIUM SERPL-MCNC: 4.2 MMOL/L — SIGNIFICANT CHANGE UP (ref 3.5–5.3)
POTASSIUM SERPL-SCNC: 4.2 MMOL/L — SIGNIFICANT CHANGE UP (ref 3.5–5.3)
PROT SERPL-MCNC: 8.3 G/DL — SIGNIFICANT CHANGE UP (ref 6–8.3)
RBC # BLD: 4.12 M/UL — SIGNIFICANT CHANGE UP (ref 3.8–5.2)
RBC # FLD: 11.9 % — SIGNIFICANT CHANGE UP (ref 10.3–14.5)
RH IG SCN BLD-IMP: POSITIVE — SIGNIFICANT CHANGE UP
SODIUM SERPL-SCNC: 138 MMOL/L — SIGNIFICANT CHANGE UP (ref 135–145)
WBC # BLD: 7.44 K/UL — SIGNIFICANT CHANGE UP (ref 3.8–10.5)
WBC # FLD AUTO: 7.44 K/UL — SIGNIFICANT CHANGE UP (ref 3.8–10.5)

## 2024-05-09 PROCEDURE — 87641 MR-STAPH DNA AMP PROBE: CPT

## 2024-05-09 PROCEDURE — 85027 COMPLETE CBC AUTOMATED: CPT

## 2024-05-09 PROCEDURE — 87640 STAPH A DNA AMP PROBE: CPT

## 2024-05-09 PROCEDURE — 86901 BLOOD TYPING SEROLOGIC RH(D): CPT

## 2024-05-09 PROCEDURE — 86900 BLOOD TYPING SEROLOGIC ABO: CPT

## 2024-05-09 PROCEDURE — 83036 HEMOGLOBIN GLYCOSYLATED A1C: CPT

## 2024-05-09 PROCEDURE — G0463: CPT

## 2024-05-09 PROCEDURE — 80053 COMPREHEN METABOLIC PANEL: CPT

## 2024-05-09 PROCEDURE — 83880 ASSAY OF NATRIURETIC PEPTIDE: CPT

## 2024-05-09 PROCEDURE — 86850 RBC ANTIBODY SCREEN: CPT

## 2024-05-09 NOTE — H&P PST ADULT - HISTORY OF PRESENT ILLNESS
73 year old female referred by Dr. Alia Crockett for her aortic stenosis. She has a medical history significant for HTN and DMT2. She was recently admitted at [facility] with a left hip fracture requiring intra medullary nailing. During her admission she was found to have severe aortic stenosis with a valve area of 0.68cm^2, peak and mean gradients of 115 and 69mmHg a peak velocity of 5.4m/sec and a DVI of 0.18 with a normal EF on 2/26/2024. She now presents to [facility] for a scheduled TAVR on 5/16/2024. Denies recent fevers, chills, cough or N&V and feels well otherwise.

## 2024-05-09 NOTE — H&P PST ADULT - PROBLEM SELECTOR PLAN 3
HgbA1C sent in PST today. Last dose of Metformin to be taken day prior to sx, pt instructed. FS to be done in AM day of sx.

## 2024-05-09 NOTE — H&P PST ADULT - PROBLEM SELECTOR PLAN 1
Patient is scheduled for 09/09/2021 at 4PM in [place]   Scheduled for sx on 5/16/2024. Surgical, Chlorhexidine and Incentive Spirometry instructions reviewed and provided to pt.

## 2024-05-09 NOTE — H&P PST ADULT - ASSESSMENT
CAPRINI SCORE [CLOT updated 18]    AGE RELATED RISK FACTORS                                                       MOBILITY RELATED FACTORS  [ ] Age 41-60 years                                            (1 Point)                    [ ] Bed rest                                                        (1 Point)  [ ] Age: 61-74 years                                           (2 Points)                  [ ] Plaster cast                                                   (2 Points)  [ ] Age= 75 years                                              (3 Points)                    [ ] Bed bound for more than 72 hours                 (2 Points)    DISEASE RELATED RISK FACTORS                                               GENDER SPECIFIC FACTORS  [ ] Edema in the lower extremities                       (1 Point)              [ ] Pregnancy                                                     (1 Point)  [ ] Varicose veins                                               (1 Point)                     [ ] Post-partum < 6 weeks                                   (1 Point)             [ ] BMI > 25 Kg/m2                                            (1 Point)                     [ ] Hormonal therapy  or oral contraception          (1 Point)                 [ ] Sepsis (in the previous month)                        (1 Point)               [ ] History of pregnancy complications                 (1 point)  [ ] Pneumonia or serious lung disease                                               [ ] Unexplained or recurrent                     (1 Point)           (in the previous month)                               (1 Point)  [ ] Abnormal pulmonary function test                     (1 Point)                 SURGERY RELATED RISK FACTORS  [ ] Acute myocardial infarction                              (1 Point)               [ ]  Section                                             (1 Point)  [ ] Congestive heart failure (in the previous month)  (1 Point)      [ ] Minor surgery                                                  (1 Point)   [ ] Inflammatory bowel disease                             (1 Point)               [ ] Arthroscopic surgery                                        (2 Points)  [ ] Central venous access                                      (2 Points)                [ ] General surgery lasting more than 45 minutes (2 points)  [ ] Present or previous malignancy                     (2 Points)                [ ] Elective arthroplasty                                         (5 points)    [ ] Stroke (in the previous month)                          (5 Points)                                                                                                                                                           HEMATOLOGY RELATED FACTORS                                                 TRAUMA RELATED RISK FACTORS  [ ] Prior episodes of VTE                                     (3 Points)                [ ] Fracture of the hip, pelvis, or leg                       (5 Points)  [ ] Positive family history for VTE                         (3 Points)             [ ] Acute spinal cord injury (in the previous month)  (5 Points)  [ ] Prothrombin 39377 A                                     (3 Points)               [ ] Paralysis  (less than 1 month)                             (5 Points)  [ ] Factor V Leiden                                             (3 Points)                  [ ] Multiple Trauma within 1 month                        (5 Points)  [ ] Lupus anticoagulants                                     (3 Points)                                                           [ ] Anticardiolipin antibodies                               (3 Points)                                                       [ ] High homocysteine in the blood                      (3 Points)                                             [ ] Other congenital or acquired thrombophilia      (3 Points)                                                [ ] Heparin induced thrombocytopenia                  (3 Points)                                     Total Score [ 5  ]    Denies loose/cracked/removable teeth  Mallampati II

## 2024-05-09 NOTE — H&P PST ADULT - NSANTHOSAYNRD_GEN_A_CORE
No. JAGDEEP screening performed.  STOP BANG Legend: 0-2 = LOW Risk; 3-4 = INTERMEDIATE Risk; 5-8 = HIGH Risk

## 2024-05-09 NOTE — H&P PST ADULT - MUSCULOSKELETAL
due to recent surgery/decreased ROM due to pain/strength 5/5 bilateral upper extremities/abnormal gait details…

## 2024-05-09 NOTE — H&P PST ADULT - NSICDXPASTMEDICALHX_GEN_ALL_CORE_FT
PAST MEDICAL HISTORY:  DM2 (diabetes mellitus, type 2)     HTN (hypertension)     Nonrheumatic aortic valve stenosis

## 2024-05-10 LAB
MRSA PCR RESULT.: SIGNIFICANT CHANGE UP
S AUREUS DNA NOSE QL NAA+PROBE: DETECTED

## 2024-05-13 ENCOUNTER — OUTPATIENT (OUTPATIENT)
Dept: OUTPATIENT SERVICES | Facility: HOSPITAL | Age: 74
LOS: 1 days | Discharge: HOME | End: 2024-05-13
Payer: MEDICARE

## 2024-05-13 DIAGNOSIS — Z98.890 OTHER SPECIFIED POSTPROCEDURAL STATES: Chronic | ICD-10-CM

## 2024-05-13 DIAGNOSIS — Z87.81 PERSONAL HISTORY OF (HEALED) TRAUMATIC FRACTURE: Chronic | ICD-10-CM

## 2024-05-13 DIAGNOSIS — Z01.810 ENCOUNTER FOR PREPROCEDURAL CARDIOVASCULAR EXAMINATION: ICD-10-CM

## 2024-05-13 LAB
ANION GAP SERPL CALC-SCNC: 15 MMOL/L — HIGH (ref 7–14)
BUN SERPL-MCNC: 16 MG/DL — SIGNIFICANT CHANGE UP (ref 7–23)
CALCIUM SERPL-MCNC: 10.3 MG/DL — SIGNIFICANT CHANGE UP (ref 8.4–10.5)
CHLORIDE SERPL-SCNC: 100 MMOL/L — SIGNIFICANT CHANGE UP (ref 98–107)
CO2 SERPL-SCNC: 22 MMOL/L — SIGNIFICANT CHANGE UP (ref 22–31)
CREAT SERPL-MCNC: 0.6 MG/DL — SIGNIFICANT CHANGE UP (ref 0.5–1.3)
EGFR: 94 ML/MIN/1.73M2 — SIGNIFICANT CHANGE UP
GLUCOSE SERPL-MCNC: 144 MG/DL — HIGH (ref 70–99)
HCT VFR BLD CALC: 36.6 % — SIGNIFICANT CHANGE UP (ref 34.5–45)
HGB BLD-MCNC: 12.6 G/DL — SIGNIFICANT CHANGE UP (ref 11.5–15.5)
MCHC RBC-ENTMCNC: 31.2 PG — SIGNIFICANT CHANGE UP (ref 27–34)
MCHC RBC-ENTMCNC: 34.4 GM/DL — SIGNIFICANT CHANGE UP (ref 32–36)
MCV RBC AUTO: 90.6 FL — SIGNIFICANT CHANGE UP (ref 80–100)
NRBC # BLD: 0 /100 WBCS — SIGNIFICANT CHANGE UP (ref 0–0)
NRBC # FLD: 0 K/UL — SIGNIFICANT CHANGE UP (ref 0–0)
PLATELET # BLD AUTO: 242 K/UL — SIGNIFICANT CHANGE UP (ref 150–400)
POTASSIUM SERPL-MCNC: 5 MMOL/L — SIGNIFICANT CHANGE UP (ref 3.5–5.3)
POTASSIUM SERPL-SCNC: 5 MMOL/L — SIGNIFICANT CHANGE UP (ref 3.5–5.3)
RBC # BLD: 4.04 M/UL — SIGNIFICANT CHANGE UP (ref 3.8–5.2)
RBC # FLD: 11.6 % — SIGNIFICANT CHANGE UP (ref 10.3–14.5)
SODIUM SERPL-SCNC: 137 MMOL/L — SIGNIFICANT CHANGE UP (ref 135–145)
WBC # BLD: 6.11 K/UL — SIGNIFICANT CHANGE UP (ref 3.8–10.5)
WBC # FLD AUTO: 6.11 K/UL — SIGNIFICANT CHANGE UP (ref 3.8–10.5)

## 2024-05-13 PROCEDURE — 93010 ELECTROCARDIOGRAM REPORT: CPT

## 2024-05-13 PROCEDURE — 93454 CORONARY ARTERY ANGIO S&I: CPT | Mod: 26

## 2024-05-13 PROCEDURE — 99152 MOD SED SAME PHYS/QHP 5/>YRS: CPT

## 2024-05-13 RX ORDER — SODIUM CHLORIDE 9 MG/ML
3 INJECTION INTRAMUSCULAR; INTRAVENOUS; SUBCUTANEOUS EVERY 8 HOURS
Refills: 0 | Status: DISCONTINUED | OUTPATIENT
Start: 2024-05-13 | End: 2024-05-27

## 2024-05-13 NOTE — H&P CARDIOLOGY - NSICDXPASTMEDICALHX_GEN_ALL_CORE_FT
PAST MEDICAL HISTORY:  DM2 (diabetes mellitus, type 2)     HLD (hyperlipidemia)     HTN (hypertension)     Nonrheumatic aortic valve stenosis

## 2024-05-13 NOTE — H&P CARDIOLOGY - HISTORY OF PRESENT ILLNESS
72 y/o F w/ PMH of HTN, HLD, DM2 and Severe AS(PIPPA 0.68) presents for cardiac catheretization. Pt had a fall in February which resulted in a left hip fracture. In preparation for surgery echocardiogram found that she had severe AS. Pt admits to mild fatigue on exertion but she has been less active since the surgery. Pt is scheduled for TAVR on 5/16 so will need angiogram prior to proceeding.

## 2024-05-13 NOTE — H&P CARDIOLOGY - RESPIRATORY
detailed exam
no loss of consciousness, no gait abnormality, no headache, no sensory deficits, and no weakness.

## 2024-05-15 ENCOUNTER — TRANSCRIPTION ENCOUNTER (OUTPATIENT)
Age: 74
End: 2024-05-15

## 2024-05-16 ENCOUNTER — APPOINTMENT (OUTPATIENT)
Dept: CARDIOTHORACIC SURGERY | Facility: HOSPITAL | Age: 74
End: 2024-05-16

## 2024-05-16 ENCOUNTER — INPATIENT (INPATIENT)
Facility: HOSPITAL | Age: 74
LOS: 0 days | Discharge: HOME | DRG: 307 | End: 2024-05-17
Attending: THORACIC SURGERY (CARDIOTHORACIC VASCULAR SURGERY) | Admitting: THORACIC SURGERY (CARDIOTHORACIC VASCULAR SURGERY)
Payer: MEDICARE

## 2024-05-16 ENCOUNTER — RESULT REVIEW (OUTPATIENT)
Age: 74
End: 2024-05-16

## 2024-05-16 VITALS
HEART RATE: 71 BPM | RESPIRATION RATE: 16 BRPM | DIASTOLIC BLOOD PRESSURE: 73 MMHG | HEIGHT: 62 IN | WEIGHT: 141.98 LBS | OXYGEN SATURATION: 100 % | TEMPERATURE: 97 F | SYSTOLIC BLOOD PRESSURE: 160 MMHG

## 2024-05-16 DIAGNOSIS — Z98.890 OTHER SPECIFIED POSTPROCEDURAL STATES: Chronic | ICD-10-CM

## 2024-05-16 DIAGNOSIS — I35.0 NONRHEUMATIC AORTIC (VALVE) STENOSIS: ICD-10-CM

## 2024-05-16 DIAGNOSIS — Z87.81 PERSONAL HISTORY OF (HEALED) TRAUMATIC FRACTURE: Chronic | ICD-10-CM

## 2024-05-16 LAB
GLUCOSE BLDC GLUCOMTR-MCNC: 119 MG/DL — HIGH (ref 70–99)
GLUCOSE BLDC GLUCOMTR-MCNC: 120 MG/DL — HIGH (ref 70–99)
GLUCOSE BLDC GLUCOMTR-MCNC: 160 MG/DL — HIGH (ref 70–99)
RH IG SCN BLD-IMP: POSITIVE — SIGNIFICANT CHANGE UP

## 2024-05-16 PROCEDURE — 33361 REPLACE AORTIC VALVE PERQ: CPT | Mod: 62,Q0

## 2024-05-16 PROCEDURE — 93306 TTE W/DOPPLER COMPLETE: CPT | Mod: 26

## 2024-05-16 PROCEDURE — 93010 ELECTROCARDIOGRAM REPORT: CPT

## 2024-05-16 PROCEDURE — 33361 REPLACE AORTIC VALVE PERQ: CPT | Mod: Q0,62

## 2024-05-16 DEVICE — PACING CATH PACEL RIGHT HEART CURVE 5FR: Type: IMPLANTABLE DEVICE | Status: FUNCTIONAL

## 2024-05-16 DEVICE — CATH IMPULSE PIG 145 5FR X 110CM: Type: IMPLANTABLE DEVICE | Status: FUNCTIONAL

## 2024-05-16 DEVICE — GUIDEWIRE AMPLATZ SUPER-STIFF STRAIGHT .035" X 260CM: Type: IMPLANTABLE DEVICE | Status: FUNCTIONAL

## 2024-05-16 DEVICE — WIRE GD SAFARI2 275CM XSML CRV: Type: IMPLANTABLE DEVICE | Status: FUNCTIONAL

## 2024-05-16 DEVICE — IMPLANTABLE DEVICE: Type: IMPLANTABLE DEVICE | Status: FUNCTIONAL

## 2024-05-16 DEVICE — VLV TRANS CATH W/COMM SYS SAPIEN 3 ULTRA 26MM: Type: IMPLANTABLE DEVICE | Status: FUNCTIONAL

## 2024-05-16 DEVICE — CATH DIAG DXTERITY AMPLATZ LEFT 5F 100CM AL10: Type: IMPLANTABLE DEVICE | Status: FUNCTIONAL

## 2024-05-16 DEVICE — WIRE GUIDE EXCHANGE J TIP 3MM X 260CM: Type: IMPLANTABLE DEVICE | Status: FUNCTIONAL

## 2024-05-16 DEVICE — GWIRE STR .038X180 STIFF LONG TAPR: Type: IMPLANTABLE DEVICE | Status: FUNCTIONAL

## 2024-05-16 DEVICE — CATH VASCULAR EXPO VENTRICULAR PIGTAIL CURVE (PIG) 0.045" X 5FR X 100CM: Type: IMPLANTABLE DEVICE | Status: FUNCTIONAL

## 2024-05-16 DEVICE — SHEATH INTRODUCER TERUMO PINNACLE CORONARY 6FR X 10CM X 0.038" MINI WIRE: Type: IMPLANTABLE DEVICE | Status: FUNCTIONAL

## 2024-05-16 DEVICE — SUT PERCLOSE PROGLIDE 6FR: Type: IMPLANTABLE DEVICE | Status: FUNCTIONAL

## 2024-05-16 DEVICE — CATH DIAG 5F JR4 100CM: Type: IMPLANTABLE DEVICE | Status: FUNCTIONAL

## 2024-05-16 DEVICE — CATH BLLN TRU FLOW 20MMX3.5CM: Type: IMPLANTABLE DEVICE | Status: FUNCTIONAL

## 2024-05-16 DEVICE — GUIDEWIRE AMPLATZ EXTRA-STIFF CURVED .035" X 260CM: Type: IMPLANTABLE DEVICE | Status: FUNCTIONAL

## 2024-05-16 DEVICE — SHEATH INTRODUCER TERUMO PINNACLE CORONARY 8FR X 10CM X 0.038" MINI WIRE: Type: IMPLANTABLE DEVICE | Status: FUNCTIONAL

## 2024-05-16 RX ORDER — ASPIRIN/CALCIUM CARB/MAGNESIUM 324 MG
81 TABLET ORAL DAILY
Refills: 0 | Status: DISCONTINUED | OUTPATIENT
Start: 2024-05-16 | End: 2024-05-16

## 2024-05-16 RX ORDER — SODIUM CHLORIDE 9 MG/ML
250 INJECTION INTRAMUSCULAR; INTRAVENOUS; SUBCUTANEOUS
Refills: 0 | Status: DISCONTINUED | OUTPATIENT
Start: 2024-05-16 | End: 2024-05-17

## 2024-05-16 RX ORDER — ASPIRIN/CALCIUM CARB/MAGNESIUM 324 MG
81 TABLET ORAL DAILY
Refills: 0 | Status: DISCONTINUED | OUTPATIENT
Start: 2024-05-16 | End: 2024-05-17

## 2024-05-16 RX ORDER — LIDOCAINE HCL 20 MG/ML
0.2 VIAL (ML) INJECTION ONCE
Refills: 0 | Status: DISCONTINUED | OUTPATIENT
Start: 2024-05-16 | End: 2024-05-16

## 2024-05-16 RX ORDER — CEFUROXIME AXETIL 250 MG
1500 TABLET ORAL EVERY 8 HOURS
Refills: 0 | Status: COMPLETED | OUTPATIENT
Start: 2024-05-17 | End: 2024-05-17

## 2024-05-16 RX ORDER — SODIUM CHLORIDE 9 MG/ML
3 INJECTION INTRAMUSCULAR; INTRAVENOUS; SUBCUTANEOUS EVERY 8 HOURS
Refills: 0 | Status: DISCONTINUED | OUTPATIENT
Start: 2024-05-16 | End: 2024-05-16

## 2024-05-16 RX ORDER — CHLORHEXIDINE GLUCONATE 213 G/1000ML
1 SOLUTION TOPICAL ONCE
Refills: 0 | Status: DISCONTINUED | OUTPATIENT
Start: 2024-05-16 | End: 2024-05-16

## 2024-05-16 RX ORDER — CEFUROXIME AXETIL 250 MG
1500 TABLET ORAL ONCE
Refills: 0 | Status: DISCONTINUED | OUTPATIENT
Start: 2024-05-16 | End: 2024-05-17

## 2024-05-16 RX ADMIN — SODIUM CHLORIDE 250 MILLILITER(S): 9 INJECTION INTRAMUSCULAR; INTRAVENOUS; SUBCUTANEOUS at 17:36

## 2024-05-16 RX ADMIN — Medication 81 MILLIGRAM(S): at 22:52

## 2024-05-16 NOTE — CHART NOTE - NSCHARTNOTEFT_GEN_A_CORE
74 yo F with Critical AS  s/p TAVR via bi-lateral groin   26mm Melissa valve  Lt groin 16Fr x2 perclose, Rt groin perclose x1. RFV pulled   Tylenol 1gr, Cefuroxime 1500mg, Zofran 4mg given at 14:15  1 liter IVF in   BP stable, bilateral groin stable   ASA 81 mg today and continue in am   TTE in am

## 2024-05-16 NOTE — PROGRESS NOTE ADULT - PROBLEM SELECTOR PLAN 1
s/p TAVR via bi-lateral groin   26mm Melissa valve  Lt groin 16Fr x2 perclose, Rt groin perclose x1. RFV pulled   Tylenol 1gr, Cefuroxime 1500mg, Zofran 4mg given at 14:15  1 liter IVF in   BP stable, bilateral groin stable   ASA 81 mg today and continue in am   TTE in am.

## 2024-05-16 NOTE — PROGRESS NOTE ADULT - ASSESSMENT
73 year old female referred by Dr. Alia Crockett for her aortic stenosis. She has a medical history significant for HTN and DMT2. She was recently admitted at [facility] with a left hip fracture requiring intra medullary nailing. During her admission she was found to have severe aortic stenosis with a valve area of 0.68cm^2, peak and mean gradients of 115 and 69mmHg a peak velocity of 5.4m/sec and a DVI of 0.18 with a normal EF on 2/26/2024. She now presents to PST for a scheduled TAVR on 5/16/2024. Denies recent fevers, chills, cough or N&V and feels well otherwise.  5/16 S/P TAVR # 26 26mm Melissa valve

## 2024-05-17 ENCOUNTER — TRANSCRIPTION ENCOUNTER (OUTPATIENT)
Age: 74
End: 2024-05-17

## 2024-05-17 ENCOUNTER — RESULT REVIEW (OUTPATIENT)
Age: 74
End: 2024-05-17

## 2024-05-17 VITALS
TEMPERATURE: 98 F | SYSTOLIC BLOOD PRESSURE: 122 MMHG | DIASTOLIC BLOOD PRESSURE: 66 MMHG | RESPIRATION RATE: 16 BRPM | HEART RATE: 73 BPM | OXYGEN SATURATION: 97 %

## 2024-05-17 LAB
ALBUMIN SERPL ELPH-MCNC: 3.8 G/DL — SIGNIFICANT CHANGE UP (ref 3.3–5)
ALP SERPL-CCNC: 81 U/L — SIGNIFICANT CHANGE UP (ref 40–120)
ALT FLD-CCNC: 12 U/L — SIGNIFICANT CHANGE UP (ref 10–45)
ANION GAP SERPL CALC-SCNC: 12 MMOL/L — SIGNIFICANT CHANGE UP (ref 5–17)
AST SERPL-CCNC: 15 U/L — SIGNIFICANT CHANGE UP (ref 10–40)
BILIRUB SERPL-MCNC: 0.3 MG/DL — SIGNIFICANT CHANGE UP (ref 0.2–1.2)
BUN SERPL-MCNC: 13 MG/DL — SIGNIFICANT CHANGE UP (ref 7–23)
CALCIUM SERPL-MCNC: 9.8 MG/DL — SIGNIFICANT CHANGE UP (ref 8.4–10.5)
CHLORIDE SERPL-SCNC: 101 MMOL/L — SIGNIFICANT CHANGE UP (ref 96–108)
CO2 SERPL-SCNC: 26 MMOL/L — SIGNIFICANT CHANGE UP (ref 22–31)
CREAT SERPL-MCNC: 0.67 MG/DL — SIGNIFICANT CHANGE UP (ref 0.5–1.3)
EGFR: 92 ML/MIN/1.73M2 — SIGNIFICANT CHANGE UP
GLUCOSE SERPL-MCNC: 129 MG/DL — HIGH (ref 70–99)
HCT VFR BLD CALC: 33.5 % — LOW (ref 34.5–45)
HGB BLD-MCNC: 11.3 G/DL — LOW (ref 11.5–15.5)
MAGNESIUM SERPL-MCNC: 1.8 MG/DL — SIGNIFICANT CHANGE UP (ref 1.6–2.6)
MCHC RBC-ENTMCNC: 31 PG — SIGNIFICANT CHANGE UP (ref 27–34)
MCHC RBC-ENTMCNC: 33.7 GM/DL — SIGNIFICANT CHANGE UP (ref 32–36)
MCV RBC AUTO: 91.8 FL — SIGNIFICANT CHANGE UP (ref 80–100)
NRBC # BLD: 0 /100 WBCS — SIGNIFICANT CHANGE UP (ref 0–0)
PHOSPHATE SERPL-MCNC: 4.1 MG/DL — SIGNIFICANT CHANGE UP (ref 2.5–4.5)
PLATELET # BLD AUTO: 197 K/UL — SIGNIFICANT CHANGE UP (ref 150–400)
POTASSIUM SERPL-MCNC: 4.3 MMOL/L — SIGNIFICANT CHANGE UP (ref 3.5–5.3)
POTASSIUM SERPL-SCNC: 4.3 MMOL/L — SIGNIFICANT CHANGE UP (ref 3.5–5.3)
PROT SERPL-MCNC: 6.8 G/DL — SIGNIFICANT CHANGE UP (ref 6–8.3)
RBC # BLD: 3.65 M/UL — LOW (ref 3.8–5.2)
RBC # FLD: 11.8 % — SIGNIFICANT CHANGE UP (ref 10.3–14.5)
SODIUM SERPL-SCNC: 139 MMOL/L — SIGNIFICANT CHANGE UP (ref 135–145)
WBC # BLD: 6.06 K/UL — SIGNIFICANT CHANGE UP (ref 3.8–10.5)
WBC # FLD AUTO: 6.06 K/UL — SIGNIFICANT CHANGE UP (ref 3.8–10.5)

## 2024-05-17 PROCEDURE — L8699: CPT

## 2024-05-17 PROCEDURE — 93306 TTE W/DOPPLER COMPLETE: CPT

## 2024-05-17 PROCEDURE — C1725: CPT

## 2024-05-17 PROCEDURE — 80053 COMPREHEN METABOLIC PANEL: CPT

## 2024-05-17 PROCEDURE — 76000 FLUOROSCOPY <1 HR PHYS/QHP: CPT

## 2024-05-17 PROCEDURE — C1769: CPT

## 2024-05-17 PROCEDURE — 83735 ASSAY OF MAGNESIUM: CPT

## 2024-05-17 PROCEDURE — C1894: CPT

## 2024-05-17 PROCEDURE — C1889: CPT

## 2024-05-17 PROCEDURE — 93306 TTE W/DOPPLER COMPLETE: CPT | Mod: 26

## 2024-05-17 PROCEDURE — 99239 HOSP IP/OBS DSCHRG MGMT >30: CPT

## 2024-05-17 PROCEDURE — C1887: CPT

## 2024-05-17 PROCEDURE — C1760: CPT

## 2024-05-17 PROCEDURE — 84100 ASSAY OF PHOSPHORUS: CPT

## 2024-05-17 PROCEDURE — 93005 ELECTROCARDIOGRAM TRACING: CPT

## 2024-05-17 PROCEDURE — 93010 ELECTROCARDIOGRAM REPORT: CPT

## 2024-05-17 PROCEDURE — 85027 COMPLETE CBC AUTOMATED: CPT

## 2024-05-17 PROCEDURE — 82962 GLUCOSE BLOOD TEST: CPT

## 2024-05-17 RX ORDER — ACETAMINOPHEN 500 MG
325 TABLET ORAL EVERY 6 HOURS
Refills: 0 | Status: DISCONTINUED | OUTPATIENT
Start: 2024-05-17 | End: 2024-05-17

## 2024-05-17 RX ORDER — PANTOPRAZOLE SODIUM 20 MG/1
1 TABLET, DELAYED RELEASE ORAL
Qty: 7 | Refills: 0
Start: 2024-05-17 | End: 2024-05-23

## 2024-05-17 RX ORDER — PANTOPRAZOLE SODIUM 20 MG/1
40 TABLET, DELAYED RELEASE ORAL
Refills: 0 | Status: DISCONTINUED | OUTPATIENT
Start: 2024-05-17 | End: 2024-05-17

## 2024-05-17 RX ORDER — AMLODIPINE BESYLATE 2.5 MG/1
1 TABLET ORAL
Qty: 0 | Refills: 0 | DISCHARGE
Start: 2024-05-17

## 2024-05-17 RX ORDER — SIMVASTATIN 20 MG/1
20 TABLET, FILM COATED ORAL AT BEDTIME
Refills: 0 | Status: DISCONTINUED | OUTPATIENT
Start: 2024-05-17 | End: 2024-05-17

## 2024-05-17 RX ORDER — ACETAMINOPHEN 500 MG
2 TABLET ORAL
Qty: 0 | Refills: 0 | DISCHARGE

## 2024-05-17 RX ORDER — ASPIRIN/CALCIUM CARB/MAGNESIUM 324 MG
1 TABLET ORAL
Qty: 0 | Refills: 0 | DISCHARGE
Start: 2024-05-17

## 2024-05-17 RX ORDER — AMLODIPINE BESYLATE 2.5 MG/1
5 TABLET ORAL DAILY
Refills: 0 | Status: DISCONTINUED | OUTPATIENT
Start: 2024-05-17 | End: 2024-05-17

## 2024-05-17 RX ORDER — SIMVASTATIN 20 MG/1
1 TABLET, FILM COATED ORAL
Refills: 0 | DISCHARGE

## 2024-05-17 RX ORDER — METFORMIN HYDROCHLORIDE 850 MG/1
1 TABLET ORAL
Qty: 0 | Refills: 0 | DISCHARGE
Start: 2024-05-17

## 2024-05-17 RX ORDER — METFORMIN HYDROCHLORIDE 850 MG/1
1000 TABLET ORAL
Refills: 0 | Status: DISCONTINUED | OUTPATIENT
Start: 2024-05-17 | End: 2024-05-17

## 2024-05-17 RX ORDER — SIMVASTATIN 20 MG/1
1 TABLET, FILM COATED ORAL
Qty: 0 | Refills: 0 | DISCHARGE
Start: 2024-05-17

## 2024-05-17 RX ORDER — METFORMIN HYDROCHLORIDE 850 MG/1
1 TABLET ORAL
Refills: 0 | DISCHARGE

## 2024-05-17 RX ORDER — AMLODIPINE BESYLATE 2.5 MG/1
1 TABLET ORAL
Refills: 0 | DISCHARGE

## 2024-05-17 RX ADMIN — Medication 81 MILLIGRAM(S): at 12:20

## 2024-05-17 RX ADMIN — Medication 325 MILLIGRAM(S): at 12:20

## 2024-05-17 RX ADMIN — Medication 100 MILLIGRAM(S): at 01:00

## 2024-05-17 RX ADMIN — AMLODIPINE BESYLATE 5 MILLIGRAM(S): 2.5 TABLET ORAL at 12:21

## 2024-05-17 RX ADMIN — PANTOPRAZOLE SODIUM 40 MILLIGRAM(S): 20 TABLET, DELAYED RELEASE ORAL at 12:20

## 2024-05-17 RX ADMIN — Medication 100 MILLIGRAM(S): at 06:30

## 2024-05-17 NOTE — PROGRESS NOTE ADULT - SUBJECTIVE AND OBJECTIVE BOX
Structural Heart Team    Ms. Vilchis has no complaints and denies chest pain/pressure, sob and dizziness as well as groin pain. She had some persistent incisional oozing in the left groin earlier this morning for which she had a pressure dressing applied.         REVIEW OF SYSTEMS:    CONSTITUTIONAL: No weakness, fevers or chills  EYES/ENT: No visual changes;  No vertigo or throat pain   NECK: No pain or stiffness  RESPIRATORY: No cough, wheezing, hemoptysis; No shortness of breath  CARDIOVASCULAR: No chest pain or palpitations  GASTROINTESTINAL: No abdominal or epigastric pain. No nausea, vomiting, or hematemesis; No diarrhea or constipation. No melena or hematochezia.  GENITOURINARY: No dysuria, frequency or hematuria  NEUROLOGICAL: No numbness or weakness  SKIN: No itching, rashes      Allergies    No Known Allergies    Intolerances      Vital Signs Last 24 Hrs  T(C): 36.9 (17 May 2024 12:20), Max: 37 (16 May 2024 18:50)  T(F): 98.5 (17 May 2024 12:20), Max: 98.6 (16 May 2024 18:50)  HR: 73 (17 May 2024 12:20) (56 - 73)  BP: 122/66 (17 May 2024 12:20) (82/43 - 160/73)  BP(mean): 87 (17 May 2024 05:34) (66 - 87)  RR: 16 (17 May 2024 12:20) (11 - 24)  SpO2: 97% (17 May 2024 12:20) (91% - 100%)    Parameters below as of 17 May 2024 12:20  Patient On (Oxygen Delivery Method): room air        MEDICATIONS  (STANDING):  amLODIPine   Tablet 5 milliGRAM(s) Oral daily  aspirin enteric coated 81 milliGRAM(s) Oral daily  cefuroxime  IVPB 1500 milliGRAM(s) IV Intermittent once  metFORMIN 1000 milliGRAM(s) Oral two times a day  pantoprazole    Tablet 40 milliGRAM(s) Oral before breakfast  simvastatin 20 milliGRAM(s) Oral at bedtime  sodium chloride 0.9%. 250 milliLiter(s) (250 mL/Hr) IV Continuous <Continuous>      Exam-  General: NAD, WDWN, appropriate affect  Cor: s1s2, RRR, no murmurs   EKG/Tele: SR, no conduction delays  Pulm: Clear, no wheezes, rales, or rhonchi, no use of accessory muscles  Gastrointestinal: soft, nontender, nondistended, +bowel sounds  Extremities: no edema, 2+ DP pulses b/l  Groin: dressings intact, nontender, soft, no hematoma b/l  Neuro: A&Ox3, nonfocal                          11.3   6.06  )-----------( 197      ( 17 May 2024 05:50 )             33.5   05-17    139  |  101  |  13  ----------------------------<  129<H>  4.3   |  26  |  0.67    Ca    9.8      17 May 2024 05:50  Phos  4.1     05-17  Mg     1.8     05-17    TPro  6.8  /  Alb  3.8  /  TBili  0.3  /  DBili  x   /  AST  15  /  ALT  12  /  AlkPhos  81  05-17    I&O's Summary    16 May 2024 07:01  -  17 May 2024 07:00  --------------------------------------------------------  IN: 170 mL / OUT: 300 mL / NET: -130 mL      < from: TTE W or WO Ultrasound Enhancing Agent (05.17.24 @ 09:21) >  CONCLUSIONS:      1. Left ventricular cavity is normal in size. Left ventricular systolic function is normal. There are no regional wall motion abnormalities seen.   2. A Transcatheter deployed(TAVR) valve replacement is present in the aortic position The prosthetic valve has normal function. No intravalvular regurgitation No paravalvular regurgitation.    ________________________________________________________________________________________  FINDINGS:     Left Ventricle:  The left ventricular cavity is normal in size. Left ventricular wall thickness is normal. Left ventricular systolic function is normal with an ejection fraction visually estimated at 65 to 70%. There are no regional wall motion abnormalities seen. Impaired relaxation with normal filling pressure.     Right Ventricle:  The right ventricular cavity is normal in size, with normal wall thickness and normal systolic function. Tricuspid annular plane systolic excursion (TAPSE) is 2.8 cm (normal >=1.7 cm).     Left Atrium:  The left atrium is mildly dilated with an indexed volume of 38.11 ml/m².     Aortic Valve:  A a transcatheter deployed (TAVR) is present in the aortic position. The prosthetic valve has normal function. There is no intravalvular regurgitation. There is no paravalvular regurgitation. The peak transaortic velocity is 2.52 m/s, peak transaortic gradient is 25.4 mmHg and mean transaortic gradient is 10.9 mmHg with an LVOT/aortic valve VTI ratioof 0.87. The aortic valve area is estimated at 3.02 cm² by the continuity equation.     Mitral Valve:  Structurally normal mitral valve with normal leaflet excursion. There is calcification of the mitral valve annulus. There is trace mitral regurgitation.     Tricuspid Valve:  Structurally normal tricuspid valve with normal leaflet excursion. There is trace tricuspid regurgitation.     Pulmonic Valve:  The pulmonic valve was not well visualized. There is trace pulmonic regurgitation.     Pericardium:  No pericardial effusion seen.     Systemic Veins:  The inferior vena cava is normal in size measuring 1.30 cm in diameter, (normal <2.1cm) with normal inspiratory collapse (normal >50%) consistent with normal right atrial pressure (~3, range 0-5mmHg).  ____________________________________________________________________  QUANTITATIVE DATA:  Left Ventricle Measurements: (Indexed to BSA)     IVSd (2D):   1.0 cm  LVPWd (2D):  0.8 cm  LVIDd (2D):  4.8 cm  LVIDs (2D):  3.0 cm  LV Mass:     151 g91.8 g/m²  Visualized LV EF%: 65 to 70%     MV E Vmax:    0.51 m/s  MV A Vmax:    0.74 m/s  MV E/A:       0.68  e' lateral:   6.42 cm/s  e' medial:    5.98 cm/s  E/e' lateral: 7.88  E/e' medial:  8.46  E/e' Average: 8.16    Aorta Measurements: (Normal range) (Indexed to BSA)     Sinuses of Valsalva: 2.70 cm (2.7 - 3.3 cm)       Left Atrium Measurements: (Indexed to BSA)  LA Diam 2D: 3.50 cm    Right Ventricle Measurements:     TAPSE:           2.8 cm  RV S' Vmax:      16.00 cm/s  RV Base (RVID1):3.3 cm  RV Mid (RVID2):  2.1 cm       LVOT / RVOT/ Qp/Qs Data: (Indexed to BSA)  LVOT Diameter:  2.10 cm  LVOT Area:      3.46 cm²  LVOT Vmax:      2.25 m/s  LVOT Vmn:       1.300 m/s  LVOT VTI:       36.80 cm  LVOT peak grad: 20 mmHg  LVOT mean grad: 5.0 mmHg  LVOT SV:        127.5 ml  77.59 ml/m²    Aortic Valve Measurements:  AV Vmax:                2.5 m/s  AV Peak Gradient:       25.4 mmHg  AV Mean Gradient:       10.9 mmHg  AV VTI:                 42.2 cm  AV VTI Ratio:           0.87  AoV EOA, Contin:        3.02 cm²  AoV EOA, Contin i:      1.84 cm²/m²  AoV Dimensionless Index 0.87    Mitral Valve Measurements:     MV E Vmax: 0.5 m/s  MV A Vmax: 0.7 m/s  MV E/A:    0.7       Tricuspid Valve Measurements:     RA Pressure: 3 mmHg    < end of copied text >          Assessment/Plan:  Ms. Vilchis is POD 1 s/p TF TAVR (26mm Melissa) for severe symptomatic AS   - EKG reviewed  - echo reviewed, valve functioning well  - left groin examined, no active drainage at this time, will monitor a little longer  -- if no further drainage, d/c home  - continue preop meds, asa  - possible d/c home later today  -- will d/c home with an MCOT to monitor for post TAVR conduction delays and arrhythmias for 30 days    - Discharge plan: follow up with Dr. Mike in one week and follow up with cardiologist as well.  Echo will be done at 1 month follow up visit.  Plan discussed with patient     KEVON Quintero  [phone]    
  Subjective: Pt states " Hello " denies any CP, SOB, N/V and palpitations. No acute events overnight.     VITAL SIGNS    Telemetry:  SR 59  Vital Signs Last 24 Hrs  T(C): 37 (05-16-24 @ 18:50), Max: 37 (05-16-24 @ 18:50)  T(F): 98.6 (05-16-24 @ 18:50), Max: 98.6 (05-16-24 @ 18:50)  HR: 56 (05-16-24 @ 18:50) (56 - 71)  BP: 93/55 (05-16-24 @ 18:50) (82/43 - 160/73)  RR: 18 (05-16-24 @ 18:50) (11 - 24)  SpO2: 91% (05-16-24 @ 18:50) (91% - 100%)             Daily Height in cm: 157.48 (16 May 2024 14:31)    Daily     Drug Dosing Weight        CAPILLARY BLOOD GLUCOSE      POCT Blood Glucose.: 119 mg/dL (16 May 2024 17:33)  POCT Blood Glucose.: 120 mg/dL (16 May 2024 13:35)          MEDICATIONS  aspirin enteric coated 81 milliGRAM(s) Oral daily  cefuroxime  IVPB 1500 milliGRAM(s) IV Intermittent once  sodium chloride 0.9%. 250 milliLiter(s) IV Continuous <Continuous>        PHYSICAL EXAM    Neurology: A&Ox3, nonfocal, no gross deficits  CV : RRR+S1S2  Sternal Wound :  No Wound  Lungs: Respirations non-labored, B/L BS  Abdomen: Soft, NT/ND, +BSx4Q,  (-)N/V/D  : Voiding without difficulty  Extremities: No B/L LE edema, negative calf tenderness, +PP , B/L groins CGDI      LABS                             PAST MEDICAL & SURGICAL HISTORY:  DM2 (diabetes mellitus, type 2)      HTN (hypertension)      Nonrheumatic aortic valve stenosis      HLD (hyperlipidemia)      History of fracture of left hip      History of colonoscopy           Physical Therapy Rec:   Home  [ X ]   Home w/ PT  [  ]  Rehab  [  ]    Discussed with CT Surgery attending.

## 2024-05-17 NOTE — DISCHARGE NOTE PROVIDER - NSDCFUSCHEDAPPT_GEN_ALL_CORE_FT
Neil Mike  [place] [facility]  CTSURG [address]  Scheduled Appointment: 05/21/2024    Timothy Soliz  [place] [facility]  ORTHOSURG [address] [facility]  Scheduled Appointment: 06/12/2024

## 2024-05-17 NOTE — PROGRESS NOTE ADULT - PROVIDER SPECIALTY LIST ADULT
(H01.001) Unspecified blepharitis right upper eyelid - Assesment : Examination revealed Blepharitis. - Plan : Warm soaks with massage to eyelid two times daily.
Structural Heart
CT Surgery

## 2024-05-17 NOTE — DISCHARGE NOTE PROVIDER - NSDCFUADDINST_GEN_ALL_CORE_FT
call DR. Mike for any groin bleeding  antibiotic prophylaxis prior to any invasive procedures including dental work  follow up echocardiogram and results of cardiac monitor (mcot) in 30 days at Dr. Miek's office  check blood sugar levels before meals and at bedtime- record levels  call DR. Mike for any groin bleeding or fever   antibiotic prophylaxis prior to any invasive procedures including dental work  follow up echocardiogram and results of cardiac monitor (mcot) in 30 days at Dr. Mike's office  check blood sugar levels before meals and at bedtime- record levels

## 2024-05-17 NOTE — DISCHARGE NOTE PROVIDER - NSDCACTIVITY_GEN_ALL_CORE
Sex allowed/Do not drive or operate machinery/Showering allowed/Do not make important decisions/Stairs allowed/Walking - Indoors allowed/No heavy lifting/straining/Walking - Outdoors allowed/Follow Instructions Provided by your Surgical Team/Activity as tolerated

## 2024-05-17 NOTE — DISCHARGE NOTE PROVIDER - NSDCCPCAREPLAN_GEN_ALL_CORE_FT
PRINCIPAL DISCHARGE DIAGNOSIS  Diagnosis: S/P TAVR (transcatheter aortic valve replacement)  Assessment and Plan of Treatment: shower daily  weigh yourself daily  continue current prescriptions as ordered  increase activity as tolerated   no added salt; low fat; low cholesterol, low salt diabetic diet  check blood sugar levels before meals and at bedtime- record levels  follow up with Cardiologist in 1-2 weeks. Call to schedule appointment.  follow up with cardiac surgeon, Dr. Mike, on   follow up with Dr. Mike in 30 days for repeat echo and results of cardiac monitor (mcot) call to schedule appointment.        PRINCIPAL DISCHARGE DIAGNOSIS  Diagnosis: S/P TAVR (transcatheter aortic valve replacement)  Assessment and Plan of Treatment: shower daily  weigh yourself daily  continue current prescriptions as ordered  increase activity as tolerated   no added salt; low fat; low cholesterol, low salt diabetic diet  check blood sugar levels before meals and at bedtime- record levels  follow up with Cardiologist in 1-2 weeks. Call to schedule appointment.  follow up with cardiac surgeon, Dr. Mike, on May 21st at 8:30 am; call to confirm appointment. [phone]  follow up with Dr. Mike in 30 days for repeat echo and results of cardiac monitor (mcot) call to schedule appointment.

## 2024-05-17 NOTE — DISCHARGE NOTE PROVIDER - CARE PROVIDERS DIRECT ADDRESSES
[email or web address]

## 2024-05-17 NOTE — DISCHARGE NOTE PROVIDER - CARE PROVIDER_API CALL
Neil Mike  Thoracic and Cardiac Surgery  [address] [place] [id number]  Phone: [phone]  Fax: [phone]  Follow Up Time:

## 2024-05-17 NOTE — DISCHARGE NOTE PROVIDER - NSDCMRMEDTOKEN_GEN_ALL_CORE_FT
amLODIPine 5 mg oral tablet: 1 tab(s) orally once a day  Aspirin  mg oral delayed release tablet: 1 tab(s) orally once a day  metFORMIN 1000 mg oral tablet: 1 tab(s) orally 2 times a day  metoprolol succinate 25 mg oral tablet, extended release: 1 tab(s) orally once a day  simvastatin 20 mg oral tablet: 1 tab(s) orally once a day   amLODIPine 5 mg oral tablet: 1 tab(s) orally once a day  aspirin 81 mg oral delayed release tablet: 1 tab(s) orally once a day  metFORMIN 1000 mg oral tablet: 1 tab(s) orally 2 times a day  pantoprazole 40 mg oral delayed release tablet: 1 tab(s) orally once a day (before a meal)  simvastatin 20 mg oral tablet: 1 tab(s) orally once a day (at bedtime)  Tylenol 325 mg oral tablet: 2 tab(s) orally every 6 hours as needed for  mild pain

## 2024-05-17 NOTE — DISCHARGE NOTE PROVIDER - HOSPITAL COURSE
73 year old female referred by Dr. Alia Crockett for her aortic stenosis. She has a medical history significant for HTN and DMT2. She was recently admitted at [facility] with a left hip fracture requiring intra medullary nailing. During her admission she was found to have severe aortic stenosis with a valve area of 0.68cm^2, peak and mean gradients of 115 and 69mmHg a peak velocity of 5.4m/sec and a DVI of 0.18 with a normal EF on 2/26/2024. She now presents to PST for a scheduled TAVR on 5/16/2024. Denies recent fevers, chills, cough or N&V and feels well otherwise.  5/16 S/P TAVR # 26 26mm Melissa valve   5/17 vss; rsr 50-60; ekg and echo this am; bilateral groin sites stable; discharge pt home today 5/17 with steve 73 year old female referred by Dr. Alia Crockett for her aortic stenosis. She has a medical history significant for HTN and DMT2. She was recently admitted at [facility] with a left hip fracture requiring intra medullary nailing. During her admission she was found to have severe aortic stenosis with a valve area of 0.68cm^2, peak and mean gradients of 115 and 69mmHg a peak velocity of 5.4m/sec and a DVI of 0.18 with a normal EF on 2/26/2024. She now presents to PST for a scheduled TAVR on 5/16/2024. Denies recent fevers, chills, cough or N&V and feels well otherwise.  5/16 S/P TAVR # 26 26mm Melissa valve   5/17 vss; rsr 50-60; ekg rsr 60's- reviewed by struct heart team; echo done- neg AR; neg pericardial effusion; bilateral groin sites stable; discharge pt home today 5/17 with steve

## 2024-05-17 NOTE — DISCHARGE NOTE PROVIDER - NSDCPNSUBOBJ_GEN_ALL_CORE
General: NAD  HEENT:  NC/AT  Neuro: A&Ox4, gait steady, speech clear, no focal deficits noted  Respiratory: B/L BS CTA, no wheeze, no rhonchi, no crackles noted  Cardiovascular: RSR 50-60; normal S1S2, no murmur noted  GI: Abd soft, NT/ND, +BSx4Q +BM  Peripheral Vascular:  B/L LE neg edema, 2+ peripheral pulses, no clubbing, cyanosis, varicosities/PVD noted  Musculoskeletal: B/L UE and LE 5/5 strength   Psychiatric: Normal mood, normal affect observed  Skin: Normal exam to inspection and palpation. no bleeding, no hematoma. General: NAD  HEENT:  NC/AT  Neuro: A&Ox4, gait steady, speech clear, no focal deficits noted  Respiratory: B/L BS CTA, no wheeze, no rhonchi, no crackles noted  Cardiovascular: RSR 50-60; normal S1S2, no murmur noted  GI: Abd soft, NT/ND, +BSx4Q +BM  Peripheral Vascular:  B/L LE neg edema, 2+ peripheral pulses, no clubbing, cyanosis, varicosities/PVD noted  Musculoskeletal: B/L UE and LE 5/5 strength   Psychiatric: Normal mood, normal affect observed  Skin: Normal exam to inspection and palpation. no bleeding, no hematoma; bilateral groin sites stable- cdi jae --> neg hematoma/bleeding     discharge pt home today 5/17 with mcot with Dr. Mike

## 2024-05-17 NOTE — DISCHARGE NOTE NURSING/CASE MANAGEMENT/SOCIAL WORK - PATIENT PORTAL LINK FT
You can access the FollowMyHealth Patient Portal offered by [facility] by registering at the following website: [email or web address]. By joining [facility]’s FollowMyHealth portal, you will also be able to view your health information using other applications (apps) compatible with our system.

## 2024-05-18 ENCOUNTER — TRANSCRIPTION ENCOUNTER (OUTPATIENT)
Age: 74
End: 2024-05-18

## 2024-05-20 ENCOUNTER — APPOINTMENT (OUTPATIENT)
Dept: CARE COORDINATION | Facility: HOME HEALTH | Age: 74
End: 2024-05-20

## 2024-05-20 VITALS — RESPIRATION RATE: 16 BRPM

## 2024-05-20 PROBLEM — E78.5 HYPERLIPIDEMIA, UNSPECIFIED: Chronic | Status: ACTIVE | Noted: 2024-05-13

## 2024-05-20 RX ORDER — PANTOPRAZOLE SODIUM 40 MG/1
40 TABLET, DELAYED RELEASE ORAL DAILY
Qty: 30 | Refills: 0 | Status: ACTIVE | COMMUNITY

## 2024-05-20 RX ORDER — ACETAMINOPHEN 325 MG/1
325 TABLET ORAL EVERY 8 HOURS
Refills: 0 | Status: DISCONTINUED | COMMUNITY
End: 2024-05-20

## 2024-05-20 RX ORDER — ASPIRIN 325 MG/1
325 TABLET, FILM COATED ORAL TWICE DAILY
Refills: 0 | Status: DISCONTINUED | COMMUNITY
End: 2024-05-20

## 2024-05-20 NOTE — ASSESSMENT
[FreeTextEntry1] : Pt recovering well at home s/p TAVR. Reviewed all medications and dosages with pt understanding. Pt has all medications in home and is taking as prescribed. Additionally, pt. is taking metoprolol succinate 25mg QD which is not listed on her discharge medication reconciliation. Informed CTS ACP Moni José and she is aware. Pt. will be seen in the office tomorrow for her POA. Pain controlled with current medication regimen. No new symptoms, issues or concerns to report at this time. Pt. was seen by [facility] RN on 5/18.

## 2024-05-20 NOTE — HISTORY OF PRESENT ILLNESS
[Home] : at home, [unfilled] , at the time of the visit. [Other Location: e.g. Home (Enter Location, City,State)___] : at [unfilled] [Verbal consent obtained from patient] : the patient, [unfilled] [FreeTextEntry1] : 73 year old female referred by Dr. Alia Crockett for her aortic stenosis. She  has a medical history significant for HTN and DMT2. She was recently admitted  at [facility] with a left hip fracture requiring intra medullary nailing. During her  admission she was found to have severe aortic stenosis with a valve area of  0.68cm^2, peak and mean gradients of 115 and 69mmHg a peak velocity of 5.4m/sec  and a DVI of 0.18 with a normal EF on 2/26/2024. She now presents to [facility] for a  scheduled TAVR on 5/16/2024. Denies recent fevers, chills, cough or N&V and  feels well otherwise.  5/16 S/P TAVR # 26 26mm Melissa valve  5/17 vss; rsr 50-60; ekg rsr 60's- reviewed by struct heart team; echo done-  neg AR; neg pericardial effusion; bilateral groin sites stable; discharge pt  home today 5/17 with mcot 5/20 Initial visit completed via [facility]  CC " I am doing ok"

## 2024-05-20 NOTE — PLAN
[TextEntry] : Post Operative Care:  Pt advised of importance of daily weights. Pt instructed on how to use incentive spirometer every hour, demonstrated proper use. Pt encouraged to ambulate as much as tolerated, avoiding extreme temperatures outdoors. Also advised to cleanse incisions daily with mild soap and water and to avoid lotions, powders, ointments or creams near or on the incision. Low salt, low fat diet encouraged and discussed. Pt advised to avoid heavy lifting or straining.     Follow Your Heart team will continue to follow up with pt status.  NP/CCC roles explained with pt understanding, contact information provided. Pt agrees to call with any questions, issues or concerns.  Worsening symptoms reviewed with patient understanding.      FOLLOW UP APPOINTMENTS:  CTS:  5/21  CARDIOLOGIST:  pt. will call to schedule appointment to be seen within 2 weeks   PCP: Pt encouraged to follow up within one month of discharge

## 2024-05-20 NOTE — PHYSICAL EXAM
[] : no respiratory distress [Exaggerated Use Of Accessory Muscles For Inspiration] : no accessory muscle use [Oriented To Time, Place, And Person] : oriented to person, place, and time [FreeTextEntry1] : B/l groin WALTER, clean, dry and intact. No erythema or drainage noted.  Left groin noted to be echymotic and as per pt. small nontender hematoma.

## 2024-05-21 ENCOUNTER — APPOINTMENT (OUTPATIENT)
Dept: CARDIOTHORACIC SURGERY | Facility: CLINIC | Age: 74
End: 2024-05-21
Payer: MEDICARE

## 2024-05-21 ENCOUNTER — NON-APPOINTMENT (OUTPATIENT)
Age: 74
End: 2024-05-21

## 2024-05-21 VITALS
HEART RATE: 68 BPM | DIASTOLIC BLOOD PRESSURE: 79 MMHG | SYSTOLIC BLOOD PRESSURE: 132 MMHG | HEIGHT: 62 IN | RESPIRATION RATE: 15 BRPM | OXYGEN SATURATION: 99 % | WEIGHT: 140 LBS | BODY MASS INDEX: 25.76 KG/M2

## 2024-05-21 DIAGNOSIS — Z95.2 PRESENCE OF PROSTHETIC HEART VALVE: ICD-10-CM

## 2024-05-21 PROCEDURE — 99214 OFFICE O/P EST MOD 30 MIN: CPT

## 2024-05-21 NOTE — PHYSICAL EXAM
[] : no respiratory distress [Respiration, Rhythm And Depth] : normal respiratory rhythm and effort [Clean] : clean [Dry] : dry [Healing Well] : healing well

## 2024-05-21 NOTE — PROCEDURE
[FreeTextEntry1] : Post HENNY TTE on 5/17//2024 Left ventricular cavity is normal in size. Left ventricular systolic function is normal. There are no  regional wall motion abnormalities seen. A Transcatheter deployed (TAVR) valve replacement is present in the aortic position The prosthetic valve has normal function. No intra valvular regurgitation No paravalvular regurgitation.

## 2024-05-21 NOTE — REASON FOR VISIT
[de-identified] : TAVR using a 26mm Franco Melissa Ultra Resilia THV [de-identified] : 5/16/2024 [de-identified] : Hilda returns today for a post op visit following her TAVR using a 26mm Franco Melissa Ultra Resilia THV via transfemoral approach. She had no medication changes, and her post op course was significant only for some persistent left groin drainage which sibsided with pressure. She was discharged home POD 1 with an [facility].

## 2024-05-21 NOTE — ASSESSMENT
[FreeTextEntry1] : Hilda returns today for a post op visit following her TAVR using a 26mm Franco Melissa Ultra Resilia THV via transfemoral approach. She had no medication changes, and her post op course was significant only for some persistent left groin drainage which subsided with pressure. She was discharged home POD 1 with an [facility].  Today on exam, patient's lungs are clear bilaterally and bilateral groins are clean, dry and intact. There is no hematoma. No peripheral edema noted on exam. EKG performed and reviewed. [facility] reports were reviewed showing no new arrythmias or high degree AV blocks.    Post HENNY TTE on 5/17//2024 Left ventricular cavity is normal in size. Left ventricular systolic function is normal. There are no regional wall motion abnormalities seen. A Transcatheter deployed (TAVR) valve replacement is present in the aortic position The prosthetic valve has normal function. No intra valvular regurgitation No paravalvular regurgitation.    SBE antibiotic prophylaxis discussed at length.  Patient instructed to continue current medication regimen.   Plan: 1) Follow up with cardiologist 2) Continue medications as prescribed 3) Follow up in 30 days for repeat transthoracic echocardiogram

## 2024-06-18 ENCOUNTER — TRANSCRIPTION ENCOUNTER (OUTPATIENT)
Age: 74
End: 2024-06-18

## 2024-07-22 ENCOUNTER — RESULT REVIEW (OUTPATIENT)
Age: 74
End: 2024-07-22

## 2024-07-24 ENCOUNTER — APPOINTMENT (OUTPATIENT)
Dept: ORTHOPEDIC SURGERY | Facility: CLINIC | Age: 74
End: 2024-07-24

## 2025-01-16 ENCOUNTER — NON-APPOINTMENT (OUTPATIENT)
Age: 75
End: 2025-01-16
